# Patient Record
Sex: FEMALE | Race: WHITE
[De-identification: names, ages, dates, MRNs, and addresses within clinical notes are randomized per-mention and may not be internally consistent; named-entity substitution may affect disease eponyms.]

---

## 2018-02-23 ENCOUNTER — HOSPITAL ENCOUNTER (OUTPATIENT)
Dept: HOSPITAL 47 - RADMAMWWP | Age: 63
Discharge: HOME | End: 2018-02-23
Payer: MEDICARE

## 2018-02-23 DIAGNOSIS — Z12.31: Primary | ICD-10-CM

## 2018-02-23 PROCEDURE — 77067 SCR MAMMO BI INCL CAD: CPT

## 2018-02-23 PROCEDURE — 77063 BREAST TOMOSYNTHESIS BI: CPT

## 2018-02-26 NOTE — MM
Reason for exam: screening  (asymptomatic).

Last mammogram was performed 1 year and 6 months ago.



History:

Patient is postmenopausal.

Family history of breast cancer in maternal grandmother at age 80.



Physical Findings:

A clinical breast exam by your physician is recommended on an annual basis and 

results should be correlated with mammographic findings.



MG 3D Screening Mammo W/Cad

Bilateral CC and MLO view(s) were taken.

Prior study comparison: August 23, 2016, bilateral MG 3d screening mammo w/cad.  

April 2, 2012, bilateral digital screening mammo w/CAD.

The breast tissue is heterogeneously dense. This may lower the sensitivity of 

mammography.  There is a stable round circumscribed right lower inner quadrant 

mass from 2016 and decreased in size from 2012. No suspicious abnormality.





ASSESSMENT: Benign, BI-RAD 2



RECOMMENDATION:

Routine screening mammogram of both breasts in 1 year.

## 2018-05-23 ENCOUNTER — HOSPITAL ENCOUNTER (OUTPATIENT)
Dept: HOSPITAL 47 - RADMRIMAIN | Age: 63
Discharge: HOME | End: 2018-05-23
Attending: NURSE PRACTITIONER
Payer: MEDICARE

## 2018-05-23 DIAGNOSIS — M51.34: Primary | ICD-10-CM

## 2018-05-23 DIAGNOSIS — G35: ICD-10-CM

## 2018-05-23 PROCEDURE — 82565 ASSAY OF CREATININE: CPT

## 2018-05-23 PROCEDURE — 72157 MRI CHEST SPINE W/O & W/DYE: CPT

## 2018-05-24 NOTE — MR
Thoracic spine MRI with and without contrast

 

HISTORY: Mid back pain, G 35

 

Multiplanar multisequence and postcontrast images obtained through the thoracic spine following 5.5 c
c Gadavist IV.

 

Correlation to CT chest 12/10/2013

 

Thoracic vertebral bodies show preserved height and alignment. Endplate discogenic marrow signal gomez
ge is present at multiple levels. There is multilevel spondylosis. Loss of disc height signal at inte
rvertebral levels is compatible with disc desiccation and degenerative disc disease. There is no sign
ificant foraminal encroachment or spinal stenosis, mild multilevel disc bulges are minimal. Thoracic 
cord signal is normal. There is no abnormal enhancement following contrast administration.

 

IMPRESSION: Mild degenerative disc disease.

## 2019-06-02 ENCOUNTER — HOSPITAL ENCOUNTER (EMERGENCY)
Dept: HOSPITAL 47 - EC | Age: 64
Discharge: HOME | End: 2019-06-02
Payer: MEDICARE

## 2019-06-02 VITALS
HEART RATE: 95 BPM | DIASTOLIC BLOOD PRESSURE: 83 MMHG | TEMPERATURE: 97.6 F | SYSTOLIC BLOOD PRESSURE: 136 MMHG | RESPIRATION RATE: 18 BRPM

## 2019-06-02 DIAGNOSIS — J44.9: ICD-10-CM

## 2019-06-02 DIAGNOSIS — R42: ICD-10-CM

## 2019-06-02 DIAGNOSIS — Z79.899: ICD-10-CM

## 2019-06-02 DIAGNOSIS — F17.200: ICD-10-CM

## 2019-06-02 DIAGNOSIS — I10: ICD-10-CM

## 2019-06-02 DIAGNOSIS — F41.9: Primary | ICD-10-CM

## 2019-06-02 LAB
ALBUMIN SERPL-MCNC: 4.7 G/DL (ref 3.5–5)
ALP SERPL-CCNC: 99 U/L (ref 38–126)
ALT SERPL-CCNC: 20 U/L (ref 9–52)
ANION GAP SERPL CALC-SCNC: 9 MMOL/L
AST SERPL-CCNC: 23 U/L (ref 14–36)
BASOPHILS # BLD AUTO: 0.1 K/UL (ref 0–0.2)
BASOPHILS NFR BLD AUTO: 1 %
BUN SERPL-SCNC: 13 MG/DL (ref 7–17)
CALCIUM SPEC-MCNC: 10 MG/DL (ref 8.4–10.2)
CHLORIDE SERPL-SCNC: 105 MMOL/L (ref 98–107)
CO2 SERPL-SCNC: 26 MMOL/L (ref 22–30)
EOSINOPHIL # BLD AUTO: 0 K/UL (ref 0–0.7)
EOSINOPHIL NFR BLD AUTO: 1 %
ERYTHROCYTE [DISTWIDTH] IN BLOOD BY AUTOMATED COUNT: 6.19 M/UL (ref 3.8–5.4)
ERYTHROCYTE [DISTWIDTH] IN BLOOD: 13.5 % (ref 11.5–15.5)
GLUCOSE SERPL-MCNC: 90 MG/DL (ref 74–99)
HCT VFR BLD AUTO: 51 % (ref 34–46)
HGB BLD-MCNC: 16.3 GM/DL (ref 11.4–16)
LYMPHOCYTES # SPEC AUTO: 1.4 K/UL (ref 1–4.8)
LYMPHOCYTES NFR SPEC AUTO: 23 %
MCH RBC QN AUTO: 26.4 PG (ref 25–35)
MCHC RBC AUTO-ENTMCNC: 32 G/DL (ref 31–37)
MCV RBC AUTO: 82.4 FL (ref 80–100)
MONOCYTES # BLD AUTO: 0.3 K/UL (ref 0–1)
MONOCYTES NFR BLD AUTO: 5 %
NEUTROPHILS # BLD AUTO: 4.3 K/UL (ref 1.3–7.7)
NEUTROPHILS NFR BLD AUTO: 69 %
PH UR: 6 [PH] (ref 5–8)
PLATELET # BLD AUTO: 253 K/UL (ref 150–450)
POTASSIUM SERPL-SCNC: 4.5 MMOL/L (ref 3.5–5.1)
PROT SERPL-MCNC: 7.9 G/DL (ref 6.3–8.2)
SODIUM SERPL-SCNC: 140 MMOL/L (ref 137–145)
SP GR UR: 1.01 (ref 1–1.03)
UROBILINOGEN UR QL STRIP: <2 MG/DL (ref ?–2)
WBC # BLD AUTO: 6.2 K/UL (ref 3.8–10.6)

## 2019-06-02 PROCEDURE — 36415 COLL VENOUS BLD VENIPUNCTURE: CPT

## 2019-06-02 PROCEDURE — 85025 COMPLETE CBC W/AUTO DIFF WBC: CPT

## 2019-06-02 PROCEDURE — 71046 X-RAY EXAM CHEST 2 VIEWS: CPT

## 2019-06-02 PROCEDURE — 99284 EMERGENCY DEPT VISIT MOD MDM: CPT

## 2019-06-02 PROCEDURE — 96374 THER/PROPH/DIAG INJ IV PUSH: CPT

## 2019-06-02 PROCEDURE — 81003 URINALYSIS AUTO W/O SCOPE: CPT

## 2019-06-02 PROCEDURE — 93005 ELECTROCARDIOGRAM TRACING: CPT

## 2019-06-02 PROCEDURE — 80053 COMPREHEN METABOLIC PANEL: CPT

## 2019-06-02 NOTE — XR
EXAMINATION TYPE: XR chest 2V

 

DATE OF EXAM: 6/2/2019

 

HISTORY: Weakness.

 

REFERENCE: NONE.

 

FINDINGS: The lungs are overinflated but clear. Pleural spaces are clear. The heart is not enlarged.

 

IMPRESSION: 

 

COPD.

## 2019-06-02 NOTE — ED
General Adult HPI





- General


Chief complaint: Dizziness


Stated complaint: facial numbness/lightheaded


Time Seen by Provider: 06/02/19 11:00


Source: patient, RN notes reviewed


Mode of arrival: wheelchair


Limitations: no limitations





- History of Present Illness


Initial comments: 





This is a 63-year-old female who presents emergency department with past medical

history significant for anxiety.  Patient states her boyfriend sleeping on a 

fishing trip tomorrow for 3 days and ever since she found out about it she has 

been very anxious.  Patient states she's been anxious since Tuesday she had 

multiple panic attacks since then today she woke up and felt very dizzy and go 

she has a history of dizziness and is taking Antivert she thought it was worse 

and wanted to come and get checked out at the emergency department.  Patient 

also states she's been having some blurred vision of the left eye and she does 

have a history of MS and has had blurred vision with EMS in the past.  Patient 

states however she did just have eye doctor appointment on Wednesday.  Patient 

denies any pain anywhere.  Patient denies any palpitations patient denies any 

shortness breath difficulty breathing.  Patient has any fever chills.  Patient 

denies any vomiting or diarrhea.  Patient denies any dysuria hematuria or 

frequency.





- Related Data


                                Home Medications











 Medication  Instructions  Recorded  Confirmed


 


Escitalopram [Lexapro] 20 mg PO DAILY 01/29/16 06/02/19


 


ALPRAZolam [Xanax] 0.25 - 0.5 mg PO BID PRN 06/02/19 06/02/19


 


ALPRAZolam [Xanax] 0.5 mg PO DAILY 06/02/19 06/02/19


 


Acetaminophen Tab [Tylenol Tab] 1,000 mg PO SUMO PRN 06/02/19 06/02/19


 


Ibuprofen [Motrin] 800 mg PO Q6H PRN 06/02/19 06/02/19


 


Interferon Beta-1A [Avonex 30 MCG 30 mcg IM ESTEVES 06/02/19 06/02/19





Pen]   


 


Meclizine [Antivert] 25 mg PO DAILY 06/02/19 06/02/19


 


Ondansetron Odt [Zofran Odt] 4 mg PO Q12HR PRN 06/02/19 06/02/19


 


Sennosides [Senna] 8.6 mg PO HS 06/02/19 06/02/19


 


amLODIPine BESYLATE/BENAZEPRIL 1 cap PO HS 06/02/19 06/02/19





[Lotrel 10-40 MG]   











                                    Allergies











Allergy/AdvReac Type Severity Reaction Status Date / Time


 


No Known Allergies Allergy   Verified 06/02/19 11:30














Review of Systems


ROS Statement: 


Those systems with pertinent positive or pertinent negative responses have been 

documented in the HPI.





ROS Other: All systems not noted in ROS Statement are negative.





Past Medical History


Past Medical History: Hypertension, Neurologic Disorder


Additional Past Medical History / Comment(s): HTN, MS


History of Any Multi-Drug Resistant Organisms: None Reported


Past Surgical History: Tonsillectomy


Past Psychological History: Anxiety


Smoking Status: Current every day smoker


Past Alcohol Use History: Rare


Past Drug Use History: None Reported





General Exam





- General Exam Comments


Initial Comments: 





GENERAL:


Patient is well-developed and well-nourished.  Patient is nontoxic and well-

hydrated and is in mild distress.





ENT:


Neck is soft and supple.  No significant lymphadenopathy is noted.  Oropharynx 

is clear.  Moist mucous membranes.  Neck has full range of motion without 

eliciting any pain. 





EYES:


The sclera were anicteric and conjunctiva were pink and moist.  Extraocular 

movements were intact and pupils were equal round and reactive to light.  Eyelid

s were unremarkable.





PULMONARY:


Unlabored respirations.  Good breath sounds bilaterally.  No audible rales 

rhonchi or wheezing was noted.





CARDIOVASCULAR:


There is a regular rate and rhythm without any murmurs gallops or rubs. 





ABDOMEN:


Soft and nontender with normal bowel sounds.  No palpable organomegaly was 

noted.  There is no palpable pulsatile mass.





SKIN:


Skin is clear with no lesions or rashes and otherwise unremarkable.





NEUROLOGIC:


Patient is alert and oriented x3.  Cranial nerves II through XII are grossly 

intact.  Motor and sensory are also intact.  Normal speech, volume and content. 

Symmetrical smile.   





MUSCULOSKELETAL:


Normal extremities with adequate strength and full range of motion.  No lower 

extremity swelling or edema.  No calf tenderness.





LYMPHATICS:


No significant lymphadenopathy is noted





PSYCHIATRIC:


Patient is moderately anxious


Limitations: no limitations





Course


                                   Vital Signs











  06/02/19





  11:00


 


Temperature 97.6 F


 


Pulse Rate 95


 


Respiratory 18





Rate 


 


Blood Pressure 136/83


 


O2 Sat by Pulse 98





Oximetry 














Medical Decision Making





- Medical Decision Making





EKG shows normal sinus rhythm at 91 bpm VT interval 244 Ray is 66 QT interval

362 QTC is 445.  Patient's EKG shows no ST segment elevation or depression or T 

wave abnormalities are noted.





Chest x-ray shows no acute abnormality





- Lab Data


Result diagrams: 


                                 06/02/19 11:33





                                 06/02/19 11:33


                                   Lab Results











  06/02/19 06/02/19 06/02/19 Range/Units





  11:33 11:33 12:06 


 


WBC  6.2    (3.8-10.6)  k/uL


 


RBC  6.19 H    (3.80-5.40)  m/uL


 


Hgb  16.3 H    (11.4-16.0)  gm/dL


 


Hct  51.0 H    (34.0-46.0)  %


 


MCV  82.4    (80.0-100.0)  fL


 


MCH  26.4    (25.0-35.0)  pg


 


MCHC  32.0    (31.0-37.0)  g/dL


 


RDW  13.5    (11.5-15.5)  %


 


Plt Count  253    (150-450)  k/uL


 


Neutrophils %  69    %


 


Lymphocytes %  23    %


 


Monocytes %  5    %


 


Eosinophils %  1    %


 


Basophils %  1    %


 


Neutrophils #  4.3    (1.3-7.7)  k/uL


 


Lymphocytes #  1.4    (1.0-4.8)  k/uL


 


Monocytes #  0.3    (0-1.0)  k/uL


 


Eosinophils #  0.0    (0-0.7)  k/uL


 


Basophils #  0.1    (0-0.2)  k/uL


 


Sodium   140   (137-145)  mmol/L


 


Potassium   4.5   (3.5-5.1)  mmol/L


 


Chloride   105   ()  mmol/L


 


Carbon Dioxide   26   (22-30)  mmol/L


 


Anion Gap   9   mmol/L


 


BUN   13   (7-17)  mg/dL


 


Creatinine   0.65   (0.52-1.04)  mg/dL


 


Est GFR (CKD-EPI)AfAm   >90   (>60 ml/min/1.73 sqM)  


 


Est GFR (CKD-EPI)NonAf   >90   (>60 ml/min/1.73 sqM)  


 


Glucose   90   (74-99)  mg/dL


 


Calcium   10.0   (8.4-10.2)  mg/dL


 


Total Bilirubin   0.5   (0.2-1.3)  mg/dL


 


AST   23   (14-36)  U/L


 


ALT   20   (9-52)  U/L


 


Alkaline Phosphatase   99   ()  U/L


 


Total Protein   7.9   (6.3-8.2)  g/dL


 


Albumin   4.7   (3.5-5.0)  g/dL


 


Urine Color    Yellow  


 


Urine Appearance    Clear  (Clear)  


 


Urine pH    6.0  (5.0-8.0)  


 


Ur Specific Gravity    1.012  (1.001-1.035)  


 


Urine Protein    Negative  (Negative)  


 


Urine Glucose (UA)    Negative  (Negative)  


 


Urine Ketones    Negative  (Negative)  


 


Urine Blood    Negative  (Negative)  


 


Urine Nitrite    Negative  (Negative)  


 


Urine Bilirubin    Negative  (Negative)  


 


Urine Urobilinogen    <2.0  (<2.0)  mg/dL


 


Ur Leukocyte Esterase    Negative  (Negative)  














Disposition


Clinical Impression: 


 Anxiety, Lightheaded





Disposition: HOME SELF-CARE


Condition: Good


Instructions (If sedation given, give patient instructions):  Lightheadedness (

ED), Anxiety (ED)


Is patient prescribed a controlled substance at d/c from ED?: No


Referrals: 


Jay Jay Monteiro MD [Primary Care Provider] - 1-2 days


Time of Disposition: 12:43

## 2020-11-30 ENCOUNTER — HOSPITAL ENCOUNTER (INPATIENT)
Dept: HOSPITAL 47 - EC | Age: 65
LOS: 5 days | Discharge: HOME HEALTH | DRG: 190 | End: 2020-12-05
Attending: HOSPITALIST | Admitting: HOSPITALIST
Payer: MEDICARE

## 2020-11-30 DIAGNOSIS — N17.0: ICD-10-CM

## 2020-11-30 DIAGNOSIS — J96.02: ICD-10-CM

## 2020-11-30 DIAGNOSIS — J44.1: Primary | ICD-10-CM

## 2020-11-30 DIAGNOSIS — F41.8: ICD-10-CM

## 2020-11-30 DIAGNOSIS — G93.41: ICD-10-CM

## 2020-11-30 DIAGNOSIS — F17.210: ICD-10-CM

## 2020-11-30 DIAGNOSIS — Z79.899: ICD-10-CM

## 2020-11-30 DIAGNOSIS — Z79.51: ICD-10-CM

## 2020-11-30 DIAGNOSIS — I11.9: ICD-10-CM

## 2020-11-30 DIAGNOSIS — R32: ICD-10-CM

## 2020-11-30 DIAGNOSIS — Z20.828: ICD-10-CM

## 2020-11-30 DIAGNOSIS — Z90.89: ICD-10-CM

## 2020-11-30 DIAGNOSIS — G25.81: ICD-10-CM

## 2020-11-30 DIAGNOSIS — Z71.6: ICD-10-CM

## 2020-11-30 DIAGNOSIS — G35: ICD-10-CM

## 2020-11-30 DIAGNOSIS — Z98.890: ICD-10-CM

## 2020-11-30 DIAGNOSIS — J96.01: ICD-10-CM

## 2020-11-30 DIAGNOSIS — F41.1: ICD-10-CM

## 2020-11-30 DIAGNOSIS — T58.91XA: ICD-10-CM

## 2020-11-30 LAB
ALBUMIN SERPL-MCNC: 4.9 G/DL (ref 3.5–5)
ALP SERPL-CCNC: 99 U/L (ref 38–126)
ALT SERPL-CCNC: 21 U/L (ref 4–34)
ANION GAP SERPL CALC-SCNC: 15 MMOL/L
APTT BLD: 18.3 SEC (ref 22–30)
AST SERPL-CCNC: 36 U/L (ref 14–36)
BASOPHILS # BLD AUTO: 0.1 K/UL (ref 0–0.2)
BASOPHILS NFR BLD AUTO: 1 %
BUN SERPL-SCNC: 18 MG/DL (ref 7–17)
CALCIUM SPEC-MCNC: 10 MG/DL (ref 8.4–10.2)
CHLORIDE SERPL-SCNC: 94 MMOL/L (ref 98–107)
CO2 SERPL-SCNC: 25 MMOL/L (ref 22–30)
EOSINOPHIL # BLD AUTO: 0.2 K/UL (ref 0–0.7)
EOSINOPHIL NFR BLD AUTO: 1 %
ERYTHROCYTE [DISTWIDTH] IN BLOOD BY AUTOMATED COUNT: 5.8 M/UL (ref 3.8–5.4)
ERYTHROCYTE [DISTWIDTH] IN BLOOD: 14.2 % (ref 11.5–15.5)
GLUCOSE SERPL-MCNC: 166 MG/DL (ref 74–99)
HCT VFR BLD AUTO: 49.8 % (ref 34–46)
HGB BLD-MCNC: 15.5 GM/DL (ref 11.4–16)
INR PPP: 0.9 (ref ?–1.2)
LYMPHOCYTES # SPEC AUTO: 1 K/UL (ref 1–4.8)
LYMPHOCYTES NFR SPEC AUTO: 8 %
MAGNESIUM SPEC-SCNC: 2 MG/DL (ref 1.6–2.3)
MCH RBC QN AUTO: 26.7 PG (ref 25–35)
MCHC RBC AUTO-ENTMCNC: 31 G/DL (ref 31–37)
MCV RBC AUTO: 85.9 FL (ref 80–100)
MONOCYTES # BLD AUTO: 0.3 K/UL (ref 0–1)
MONOCYTES NFR BLD AUTO: 2 %
NEUTROPHILS # BLD AUTO: 10.2 K/UL (ref 1.3–7.7)
NEUTROPHILS NFR BLD AUTO: 87 %
PH UR: 5 [PH] (ref 5–8)
PLATELET # BLD AUTO: 249 K/UL (ref 150–450)
POTASSIUM SERPL-SCNC: 4.6 MMOL/L (ref 3.5–5.1)
PROT SERPL-MCNC: 8.9 G/DL (ref 6.3–8.2)
PT BLD: 9.4 SEC (ref 9–12)
SODIUM SERPL-SCNC: 134 MMOL/L (ref 137–145)
SP GR UR: 1.01 (ref 1–1.03)
UROBILINOGEN UR QL STRIP: <2 MG/DL (ref ?–2)
WBC # BLD AUTO: 11.8 K/UL (ref 3.8–10.6)

## 2020-11-30 PROCEDURE — 94660 CPAP INITIATION&MGMT: CPT

## 2020-11-30 PROCEDURE — 71045 X-RAY EXAM CHEST 1 VIEW: CPT

## 2020-11-30 PROCEDURE — 71275 CT ANGIOGRAPHY CHEST: CPT

## 2020-11-30 PROCEDURE — 83605 ASSAY OF LACTIC ACID: CPT

## 2020-11-30 PROCEDURE — 36415 COLL VENOUS BLD VENIPUNCTURE: CPT

## 2020-11-30 PROCEDURE — 83735 ASSAY OF MAGNESIUM: CPT

## 2020-11-30 PROCEDURE — 96376 TX/PRO/DX INJ SAME DRUG ADON: CPT

## 2020-11-30 PROCEDURE — 84484 ASSAY OF TROPONIN QUANT: CPT

## 2020-11-30 PROCEDURE — 80053 COMPREHEN METABOLIC PANEL: CPT

## 2020-11-30 PROCEDURE — 85730 THROMBOPLASTIN TIME PARTIAL: CPT

## 2020-11-30 PROCEDURE — 85025 COMPLETE CBC W/AUTO DIFF WBC: CPT

## 2020-11-30 PROCEDURE — 87635 SARS-COV-2 COVID-19 AMP PRB: CPT

## 2020-11-30 PROCEDURE — 94640 AIRWAY INHALATION TREATMENT: CPT

## 2020-11-30 PROCEDURE — 85610 PROTHROMBIN TIME: CPT

## 2020-11-30 PROCEDURE — 93005 ELECTROCARDIOGRAM TRACING: CPT

## 2020-11-30 PROCEDURE — 81003 URINALYSIS AUTO W/O SCOPE: CPT

## 2020-11-30 PROCEDURE — 51701 INSERT BLADDER CATHETER: CPT

## 2020-11-30 PROCEDURE — 94760 N-INVAS EAR/PLS OXIMETRY 1: CPT

## 2020-11-30 PROCEDURE — 99291 CRITICAL CARE FIRST HOUR: CPT

## 2020-11-30 PROCEDURE — 76770 US EXAM ABDO BACK WALL COMP: CPT

## 2020-11-30 PROCEDURE — 96374 THER/PROPH/DIAG INJ IV PUSH: CPT

## 2020-11-30 PROCEDURE — 80048 BASIC METABOLIC PNL TOTAL CA: CPT

## 2020-11-30 PROCEDURE — 85027 COMPLETE CBC AUTOMATED: CPT

## 2020-11-30 PROCEDURE — 5A09557 ASSISTANCE WITH RESPIRATORY VENTILATION, GREATER THAN 96 CONSECUTIVE HOURS, CONTINUOUS POSITIVE AIRWAY PRESSURE: ICD-10-PCS

## 2020-11-30 RX ADMIN — IPRATROPIUM BROMIDE SCH MG: 0.5 SOLUTION RESPIRATORY (INHALATION) at 18:49

## 2020-11-30 RX ADMIN — NICOTINE SCH PATCH: 21 PATCH, EXTENDED RELEASE TRANSDERMAL at 19:01

## 2020-11-30 NOTE — ED
General Adult HPI





- General


Chief complaint: Shortness of Breath


Stated complaint: ANALI


Time Seen by Provider: 11/30/20 15:45


Source: patient, EMS, RN notes reviewed, old records reviewed


Mode of arrival: EMS


Limitations: no limitations





- History of Present Illness


Initial comments: 





This a 65-year-old female presents emergency Department with a past history of 

COPD.  According to EMS the patient was found on the ground quite cyanotic and 

he immediately put her on BiPAP and she slowly came around and had bilateral 

diffuse wheezing patient was given 3 albuterol treatments and 2 Atrovent on the 

way in.  Patient's also given Solu-Medrol.  On arrival patient was feeling 

considerably better.  Patient denies any chest pain or palpitations.  Patient 

denies any recent fever chills or cough per patient denies any abdominal pain.  

Patient denies any headache.  Patient states she does not remember what was 

going on prior to her being found on the ground.  According to EMS it sounded 

like the patient must at some sort of syncopal episode at some point





- Related Data


                                Home Medications











 Medication  Instructions  Recorded  Confirmed


 


Escitalopram [Lexapro] 20 mg PO DAILY 01/29/16 11/30/20


 


ALPRAZolam [Xanax] 0.5 mg PO HS PRN 06/02/19 11/30/20


 


ALPRAZolam [Xanax] 0.5 mg PO TID 06/02/19 11/30/20


 


Acetaminophen Tab [Tylenol Tab] 1,000 mg PO DAILY PRN 06/02/19 11/30/20


 


Ibuprofen [Motrin] 800 mg PO Q6H PRN 06/02/19 11/30/20


 


Meclizine [Antivert] 25 mg PO TID PRN 06/02/19 11/30/20


 


Ondansetron Odt [Zofran Odt] 4 mg PO Q12HR PRN 06/02/19 11/30/20


 


amLODIPine BESYLATE/BENAZEPRIL 1 cap PO HS 06/02/19 11/30/20





[Lotrel 10-40 MG]   


 


Budesonide/Formoterol Fumarate 1 puff INHALATION RT-BID 11/30/20 11/30/20





[Symbicort 160-4.5 Mcg Inhaler]   


 


Furosemide [Lasix] 20 mg PO DAILY 11/30/20 11/30/20


 


Metoprolol Tartrate [Lopressor] 25 mg PO DAILY 11/30/20 11/30/20


 


OLANZapine [ZyPREXA] 10 mg PO DAILY 11/30/20 11/30/20


 


Omeprazole 20 mg PO DAILY 11/30/20 11/30/20


 


Teriflunomide [Aubagio] 7 mg PO DAILY 11/30/20 11/30/20











                                    Allergies











Allergy/AdvReac Type Severity Reaction Status Date / Time


 


No Known Allergies Allergy   Verified 06/02/19 11:30














Review of Systems


ROS Statement: 


Those systems with pertinent positive or pertinent negative responses have been 

documented in the HPI.





ROS Other: All systems not noted in ROS Statement are negative.





Past Medical History


Past Medical History: Hypertension, Neurologic Disorder


Additional Past Medical History / Comment(s): HTN, MS


History of Any Multi-Drug Resistant Organisms: None Reported


Past Surgical History: Tonsillectomy


Past Psychological History: Anxiety


Smoking Status: Current every day smoker


Past Alcohol Use History: Rare


Past Drug Use History: None Reported





General Exam





- General Exam Comments


Initial Comments: 





GENERAL:


Patient is well-developed and well-nourished.  Patient is nontoxic and well-

hydrated and is in mild distress.





ENT:


Neck is soft and supple.  No significant lymphadenopathy is noted.  Oropharynx 

is clear.  Moist mucous membranes.  Neck has full range of motion without 

eliciting any pain.  





EYES:


The sclera were anicteric and conjunctiva were pink and moist.  Extraocular 

movements were intact and pupils were equal round and reactive to light.  

Eyelids were unremarkable.





PULMONARY:


Diffuse expiratory wheezing





CARDIOVASCULAR:


There is a regular rate and rhythm without any murmurs gallops or rubs.  





ABDOMEN:


Soft and nontender with normal bowel sounds.  





SKIN:


Skin is clear with no lesions or rashes and otherwise unremarkable.





NEUROLOGIC:


Patient is alert and oriented x3.  Cranial nerves II through XII are grossly 

intact.  Motor and sensory are also intact.  Normal speech, volume and content. 

 Symmetrical smile.  





MUSCULOSKELETAL:


Normal extremities with adequate strength and full range of motion.  





LYMPHATICS:


No significant lymphadenopathy is noted





PSYCHIATRIC:


Normal psychiatric evaluation.  


Limitations: no limitations





Course


                                   Vital Signs











  11/30/20 11/30/20 11/30/20





  15:47 15:52 16:01


 


Temperature 96.8 F L  


 


Pulse Rate 102 H 92 104 H


 


Respiratory 18 20 35 H





Rate   


 


Blood Pressure 111/70  


 


O2 Sat by Pulse 98 98 





Oximetry   














  11/30/20 11/30/20 11/30/20





  16:15 16:25 16:52


 


Temperature   


 


Pulse Rate  91 95


 


Respiratory 22 33 H 20





Rate   


 


Blood Pressure   98/74


 


O2 Sat by Pulse   98





Oximetry   














Medical Decision Making





- Medical Decision Making





EKG shows a normal sinus rhythm at 96 bpm KS interval is on a 46 QRS is 68 QT 

interval 344 QTC is 447.  Patient's EKG shows no ST segment elevation or 

depression.





Chest x-ray shows no acute abnormality.





Patient received 3 treatments of albuterol and Atrovent in the emergency 

department.  Patient improved but she remained on BiPAP.





I spoke with Dr. Flower agreed to admit the patient admitted the patient wrote 

admitting orders.  I consult pulmonary.





Patient has an elevated lactic acid and I believe it secondary to hypoxia 

because when EMS found her she was at 60% pulse ox.





- Lab Data


Result diagrams: 


                                 11/30/20 16:10





                                 11/30/20 16:10


                                   Lab Results











  11/30/20 11/30/20 11/30/20 Range/Units





  16:10 16:10 16:10 


 


WBC  11.8 H    (3.8-10.6)  k/uL


 


RBC  5.80 H    (3.80-5.40)  m/uL


 


Hgb  15.5    (11.4-16.0)  gm/dL


 


Hct  49.8 H    (34.0-46.0)  %


 


MCV  85.9    (80.0-100.0)  fL


 


MCH  26.7    (25.0-35.0)  pg


 


MCHC  31.0    (31.0-37.0)  g/dL


 


RDW  14.2    (11.5-15.5)  %


 


Plt Count  249    (150-450)  k/uL


 


MPV  9.3    


 


Neutrophils %  87    %


 


Lymphocytes %  8    %


 


Monocytes %  2    %


 


Eosinophils %  1    %


 


Basophils %  1    %


 


Neutrophils #  10.2 H    (1.3-7.7)  k/uL


 


Lymphocytes #  1.0    (1.0-4.8)  k/uL


 


Monocytes #  0.3    (0-1.0)  k/uL


 


Eosinophils #  0.2    (0-0.7)  k/uL


 


Basophils #  0.1    (0-0.2)  k/uL


 


PT   9.4   (9.0-12.0)  sec


 


INR   0.9   (<1.2)  


 


APTT   18.3 L   (22.0-30.0)  sec


 


Sodium    134 L  (137-145)  mmol/L


 


Potassium    4.6  (3.5-5.1)  mmol/L


 


Chloride    94 L  ()  mmol/L


 


Carbon Dioxide    25  (22-30)  mmol/L


 


Anion Gap    15  mmol/L


 


BUN    18 H  (7-17)  mg/dL


 


Creatinine    1.67 H  (0.52-1.04)  mg/dL


 


Est GFR (CKD-EPI)AfAm    37  (>60 ml/min/1.73 sqM)  


 


Est GFR (CKD-EPI)NonAf    32  (>60 ml/min/1.73 sqM)  


 


Glucose    166 H  (74-99)  mg/dL


 


Plasma Lactic Acid Russell     (0.7-2.0)  mmol/L


 


Calcium    10.0  (8.4-10.2)  mg/dL


 


Magnesium    2.0  (1.6-2.3)  mg/dL


 


Total Bilirubin    0.4  (0.2-1.3)  mg/dL


 


AST    36  (14-36)  U/L


 


ALT    21  (4-34)  U/L


 


Alkaline Phosphatase    99  ()  U/L


 


Troponin I     (0.000-0.034)  ng/mL


 


Total Protein    8.9 H  (6.3-8.2)  g/dL


 


Albumin    4.9  (3.5-5.0)  g/dL


 


Coronavirus (PCR)     (Not Detectd)  














  11/30/20 11/30/20 11/30/20 Range/Units





  16:10 16:10 17:10 


 


WBC     (3.8-10.6)  k/uL


 


RBC     (3.80-5.40)  m/uL


 


Hgb     (11.4-16.0)  gm/dL


 


Hct     (34.0-46.0)  %


 


MCV     (80.0-100.0)  fL


 


MCH     (25.0-35.0)  pg


 


MCHC     (31.0-37.0)  g/dL


 


RDW     (11.5-15.5)  %


 


Plt Count     (150-450)  k/uL


 


MPV     


 


Neutrophils %     %


 


Lymphocytes %     %


 


Monocytes %     %


 


Eosinophils %     %


 


Basophils %     %


 


Neutrophils #     (1.3-7.7)  k/uL


 


Lymphocytes #     (1.0-4.8)  k/uL


 


Monocytes #     (0-1.0)  k/uL


 


Eosinophils #     (0-0.7)  k/uL


 


Basophils #     (0-0.2)  k/uL


 


PT     (9.0-12.0)  sec


 


INR     (<1.2)  


 


APTT     (22.0-30.0)  sec


 


Sodium     (137-145)  mmol/L


 


Potassium     (3.5-5.1)  mmol/L


 


Chloride     ()  mmol/L


 


Carbon Dioxide     (22-30)  mmol/L


 


Anion Gap     mmol/L


 


BUN     (7-17)  mg/dL


 


Creatinine     (0.52-1.04)  mg/dL


 


Est GFR (CKD-EPI)AfAm     (>60 ml/min/1.73 sqM)  


 


Est GFR (CKD-EPI)NonAf     (>60 ml/min/1.73 sqM)  


 


Glucose     (74-99)  mg/dL


 


Plasma Lactic Acid Russell  4.7 H*    (0.7-2.0)  mmol/L


 


Calcium     (8.4-10.2)  mg/dL


 


Magnesium     (1.6-2.3)  mg/dL


 


Total Bilirubin     (0.2-1.3)  mg/dL


 


AST     (14-36)  U/L


 


ALT     (4-34)  U/L


 


Alkaline Phosphatase     ()  U/L


 


Troponin I   <0.012   (0.000-0.034)  ng/mL


 


Total Protein     (6.3-8.2)  g/dL


 


Albumin     (3.5-5.0)  g/dL


 


Coronavirus (PCR)    Not Detected  (Not Detectd)  














Critical Care Time


Critical Care Time: Yes


Total Critical Care Time: 35





Disposition


Clinical Impression: 


 Acute exacerbation of chronic obstructive pulmonary disease





Disposition: ADMITTED AS IP TO THIS HOSP


Referrals: 


Jay Jay Monteiro MD [Primary Care Provider] - 1-2 days


Time of Disposition: 18:02

## 2020-11-30 NOTE — XR
EXAMINATION TYPE: XR chest 1V portable

 

DATE OF EXAM: 11/30/2020

 

COMPARISON: Prior chest x-ray 6/2/2019

 

HISTORY: Difficulty breathing

 

TECHNIQUE: Single frontal view of the chest is obtained.

 

FINDINGS:  There is no focal air space opacity, pleural effusion, or pneumothorax seen.  The cardiac 
silhouette size is within normal limits. Patient is rotated. There are overlying cardiac leads.  The 
osseous structures are intact.

 

IMPRESSION:  No acute process.

## 2020-12-01 RX ADMIN — FORMOTEROL FUMARATE DIHYDRATE SCH: 20 SOLUTION RESPIRATORY (INHALATION) at 13:49

## 2020-12-01 RX ADMIN — IPRATROPIUM BROMIDE SCH MG: 0.5 SOLUTION RESPIRATORY (INHALATION) at 08:10

## 2020-12-01 RX ADMIN — PRAMIPEXOLE DIHYDROCHLORIDE SCH MG: 0.5 TABLET ORAL at 21:24

## 2020-12-01 RX ADMIN — NICOTINE SCH PATCH: 21 PATCH, EXTENDED RELEASE TRANSDERMAL at 10:53

## 2020-12-01 RX ADMIN — IPRATROPIUM BROMIDE SCH: 0.5 SOLUTION RESPIRATORY (INHALATION) at 11:56

## 2020-12-01 RX ADMIN — BUDESONIDE SCH MG: 1 SUSPENSION RESPIRATORY (INHALATION) at 20:40

## 2020-12-01 RX ADMIN — IPRATROPIUM BROMIDE AND ALBUTEROL SULFATE SCH ML: .5; 3 SOLUTION RESPIRATORY (INHALATION) at 15:49

## 2020-12-01 RX ADMIN — ESCITALOPRAM OXALATE SCH MG: 20 TABLET, FILM COATED ORAL at 10:46

## 2020-12-01 RX ADMIN — PRAMIPEXOLE DIHYDROCHLORIDE SCH MG: 0.5 TABLET ORAL at 16:15

## 2020-12-01 RX ADMIN — FORMOTEROL FUMARATE DIHYDRATE SCH MCG: 20 SOLUTION RESPIRATORY (INHALATION) at 20:40

## 2020-12-01 RX ADMIN — ENOXAPARIN SODIUM SCH MG: 40 INJECTION SUBCUTANEOUS at 13:58

## 2020-12-01 RX ADMIN — PANTOPRAZOLE SODIUM SCH MG: 40 TABLET, DELAYED RELEASE ORAL at 05:23

## 2020-12-01 RX ADMIN — METHYLPREDNISOLONE SODIUM SUCCINATE SCH MG: 125 INJECTION, POWDER, FOR SOLUTION INTRAMUSCULAR; INTRAVENOUS at 00:14

## 2020-12-01 RX ADMIN — IPRATROPIUM BROMIDE AND ALBUTEROL SULFATE PRN ML: .5; 3 SOLUTION RESPIRATORY (INHALATION) at 05:12

## 2020-12-01 RX ADMIN — IPRATROPIUM BROMIDE AND ALBUTEROL SULFATE SCH ML: .5; 3 SOLUTION RESPIRATORY (INHALATION) at 20:40

## 2020-12-01 RX ADMIN — METHYLPREDNISOLONE SODIUM SUCCINATE SCH MG: 125 INJECTION, POWDER, FOR SOLUTION INTRAMUSCULAR; INTRAVENOUS at 10:53

## 2020-12-01 RX ADMIN — IPRATROPIUM BROMIDE AND ALBUTEROL SULFATE PRN ML: .5; 3 SOLUTION RESPIRATORY (INHALATION) at 11:55

## 2020-12-01 RX ADMIN — METHYLPREDNISOLONE SODIUM SUCCINATE SCH MG: 125 INJECTION, POWDER, FOR SOLUTION INTRAMUSCULAR; INTRAVENOUS at 05:22

## 2020-12-01 RX ADMIN — ASPIRIN SCH: 325 TABLET ORAL at 13:51

## 2020-12-01 RX ADMIN — BUDESONIDE SCH: 1 SUSPENSION RESPIRATORY (INHALATION) at 13:48

## 2020-12-01 RX ADMIN — METOPROLOL TARTRATE SCH MG: 25 TABLET, FILM COATED ORAL at 10:52

## 2020-12-01 NOTE — P.HPIM
History of Present Illness


H&P Date: 12/01/20


Chief Complaint: Short of breath





History of presenting complaint:


This is a pleasant 65-year-old patient of visiting physicians Dr. Monteiro.  

Chronic stable medical conditions include hypertension, multiple sclerosis, 

anxiety depression, hypertension, urinary incontinence.  Long-standing smoker.  

Daughter is at the bedside.  Last 2-3 days patient had getting increasingly sh

ort of breath.  Increasing wheezing.  And out of the house to smoke today came 

back, and the last she remembers sitting on the sofa.  She was found on the 

carpeted floor.  She is very cyanotic and hypoxic and as per the EMS pulse ox 

was 60%.  Patient put on a BiPAP in the ER and patient recovered.  And after 

bronchodilators feeling better.  Still wheezing.  Has not followed with any 

doctor.  No seizure activity was reported.  No chest pain.





Review of systems:


GEN.:  Tired


EYES: None


HEENT: None


NECK: None


RESPIRATORY: As above


CARDIOVASCULAR: None


GASTROINTESTINAL: None


GENITOURINARY: Urinary incontinence


MUSCULOSKELETAL: Joint pains


LYMPHATICS: None


HEMATOLOGICAL: None  


PSYCHIATRY: Anxious


NEUROLOGICAL: None





Past medical history to include:


Multiple sclerosis, anxiety, depression, COPD, hypertension, urinary 

incontinence





Social history:


Smokes a pack and a half a day for about 49 years.  No alcohol.  Lives with her 

boyfriend





Physical examination:


VITAL SIGNS: 96.8, 1 or 2, 35, 111/70, 98% on BiPAP upon presentation


GENERAL: BMI 27.1, propped up in bed, short of breath.


EYES: Pupils equal.  Conjunctiva normal.


HEENT: External appearance of nose and ears normal, oral cavity grossly normal.


NECK: JVD not raised; masses not palpable.


HEART: First and second heart sounds are normal;  no edema.  


LUNGS: Respiratory rate increased, diminished breath sound prominent expiration 

and wheezing.  


ABDOMEN: Soft,  nontender, liver spleen not palpable, no masses palpable.  


PSYCH: [Alert and oriented x3;  mood  and affect anxious l.  


NEUROLOGICAL: Cranial nerves grossly intact; no facial asymmetry,   power and 

sensation grossly intact. 


LYMPHATICS: No lymph nodes palpable in the axilla and neck





INVESTIGATIONS, reviewed in the clinical context:


White count 11.8 hemoglobin 15.5 platelets 249 potassium 4.6 bun 18 creatinine 

1.67


Lactic acid 4.7


Troponin I less than 0.012


Coronavirus P/Cr-not detected


EKG tracing personally reviewed by me-normal sinus rhythm, P pulmonale


Chest x-ray film personally reviewed by me-no obvious infiltrate.  Prominent 

pulmonary artery





Assessment:


-Acute severe COPD exacerbation in a current smoker


-Acute hypoxic, suspect hypercapnic respiratory failure, POA


-Acute metabolic encephalopathy from CO2 narcosis likely on presentation


-Chronic nicotine dependence patient active cigarette smoker


-Essential hypertension


-Chronic urinary incontinence


-Anxiety depression not otherwise specified


-Abnormal kidney function, rule out the chronic component





Plan:


Patient be started on nebulized bronchodilators, inhaled and IV steroids.  Home 

medications resumed.  DVT prophylaxis.  Oxygen 2 toscale down.  Pulmonary been 

consulted.  Care was discussed at length with the patient and the daughter the 

bedside.





Smoke cessation counseling:


This was done with the patient.  Nicotine patch is being given.  More than 3 

minutes was spent for this





Past Medical History


Past Medical History: Hypertension, Neurologic Disorder


Additional Past Medical History / Comment(s): HTN, MS


History of Any Multi-Drug Resistant Organisms: None Reported


Past Surgical History: Tonsillectomy


Past Psychological History: Anxiety


Smoking Status: Current every day smoker


Past Alcohol Use History: Rare


Past Drug Use History: None Reported





Medications and Allergies


                                Home Medications











 Medication  Instructions  Recorded  Confirmed  Type


 


Escitalopram [Lexapro] 20 mg PO DAILY 01/29/16 11/30/20 History


 


ALPRAZolam [Xanax] 0.5 mg PO HS PRN 06/02/19 11/30/20 History


 


ALPRAZolam [Xanax] 0.5 mg PO TID 06/02/19 11/30/20 History


 


Acetaminophen Tab [Tylenol Tab] 1,000 mg PO DAILY PRN 06/02/19 11/30/20 History


 


Ibuprofen [Motrin] 800 mg PO Q6H PRN 06/02/19 11/30/20 History


 


Meclizine [Antivert] 25 mg PO TID PRN 06/02/19 11/30/20 History


 


Ondansetron Odt [Zofran Odt] 4 mg PO Q12HR PRN 06/02/19 11/30/20 History


 


amLODIPine BESYLATE/BENAZEPRIL 1 cap PO HS 06/02/19 11/30/20 History





[Lotrel 10-40 MG]    


 


Budesonide/Formoterol Fumarate 1 puff INHALATION RT-BID 11/30/20 11/30/20 

History





[Symbicort 160-4.5 Mcg Inhaler]    


 


Furosemide [Lasix] 20 mg PO DAILY 11/30/20 11/30/20 History


 


Metoprolol Tartrate [Lopressor] 25 mg PO DAILY 11/30/20 11/30/20 History


 


OLANZapine [ZyPREXA] 10 mg PO DAILY 11/30/20 11/30/20 History


 


Omeprazole 20 mg PO DAILY 11/30/20 11/30/20 History


 


Teriflunomide [Aubagio] 7 mg PO DAILY 11/30/20 11/30/20 History








                                    Allergies











Allergy/AdvReac Type Severity Reaction Status Date / Time


 


No Known Allergies Allergy   Verified 06/02/19 11:30














Physical Exam


Vitals: 


                                   Vital Signs











  Temp Pulse Pulse Resp BP BP Pulse Ox


 


 12/01/20 08:28   96     


 


 12/01/20 08:11   91     


 


 12/01/20 07:01     18    98


 


 12/01/20 06:30     18    98


 


 12/01/20 05:21   104 H     


 


 12/01/20 05:14   96     


 


 12/01/20 04:00  98.1 F   96  18    95


 


 12/01/20 02:00     20   


 


 12/01/20 01:32  98.0 F   95  20   123/79  93 L


 


 11/30/20 20:00   98   18  93/59   93 L


 


 11/30/20 19:01   94   31 H   


 


 11/30/20 19:00   94   22    98


 


 11/30/20 18:49   97   32 H   


 


 11/30/20 18:00   96   20  100/71   98


 


 11/30/20 17:00   93   20  106/59   98


 


 11/30/20 16:52   95   20  98/74   98


 


 11/30/20 16:25   91   33 H   


 


 11/30/20 16:15     22   


 


 11/30/20 16:01   104 H   35 H   


 


 11/30/20 15:52   92   20    98


 


 11/30/20 15:47  96.8 F L  102 H   18  111/70   98








                                Intake and Output











 11/30/20 12/01/20 12/01/20





 22:59 06:59 14:59


 


Intake Total  250 


 


Balance  250 


 


Intake:   


 


  Oral  250 


 


Other:   


 


  Voiding Method  Toilet 


 


  # Voids  0 


 


  Weight 67.132 kg 67.132 kg 














Results


CBC & Chem 7: 


                                 11/30/20 16:10





                                 11/30/20 16:10


Labs: 


                  Abnormal Lab Results - Last 24 Hours (Table)











  11/30/20 11/30/20 11/30/20 Range/Units





  16:10 16:10 16:10 


 


WBC  11.8 H    (3.8-10.6)  k/uL


 


RBC  5.80 H    (3.80-5.40)  m/uL


 


Hct  49.8 H    (34.0-46.0)  %


 


Neutrophils #  10.2 H    (1.3-7.7)  k/uL


 


APTT   18.3 L   (22.0-30.0)  sec


 


Sodium    134 L  (137-145)  mmol/L


 


Chloride    94 L  ()  mmol/L


 


BUN    18 H  (7-17)  mg/dL


 


Creatinine    1.67 H  (0.52-1.04)  mg/dL


 


Glucose    166 H  (74-99)  mg/dL


 


Plasma Lactic Acid Russell     (0.7-2.0)  mmol/L


 


Total Protein    8.9 H  (6.3-8.2)  g/dL














  11/30/20 11/30/20 Range/Units





  16:10 18:58 


 


WBC    (3.8-10.6)  k/uL


 


RBC    (3.80-5.40)  m/uL


 


Hct    (34.0-46.0)  %


 


Neutrophils #    (1.3-7.7)  k/uL


 


APTT    (22.0-30.0)  sec


 


Sodium    (137-145)  mmol/L


 


Chloride    ()  mmol/L


 


BUN    (7-17)  mg/dL


 


Creatinine    (0.52-1.04)  mg/dL


 


Glucose    (74-99)  mg/dL


 


Plasma Lactic Acid Russell  4.7 H*  2.2 H*  (0.7-2.0)  mmol/L


 


Total Protein    (6.3-8.2)  g/dL














Thrombosis Risk Factor Assmnt





- Choose All That Apply


Each Risk Factor Represents 2 Points: Age 61-74 years


Thrombosis Risk Factor Assessment Total Risk Factor Score: 2


Thrombosis Risk Factor Assessment Level: Low Risk

## 2020-12-01 NOTE — US
EXAMINATION TYPE: US kidneys/renal and bladder

 

DATE OF EXAM: 12/1/2020

 

COMPARISON: NONE

 

CLINICAL HISTORY: assess for CK D. Abnormal labs

 

EXAM MEASUREMENTS:

 

Right Kidney:  10.1 x 4.4 x 4.5 cm

Left Kidney: 10.1 x 4.5 x 4.2 cm

 

 

 

 

Right Kidney: Appeared wnl  

Left Kidney: Appeared wnl  

Bladder: wnl

**Bilateral Jets seen: Only left jet visualized

 

There is no evidence for hydronephrosis at this point in time.  No nephrolithiasis is seen.  No chiara
s are identified.  The urinary bladder is anechoic.  Bilateral ureteral jets are seen. Cortical medul
jean-claude differentiation preserved. Cortical thickness preserved.

 

 

 

IMPRESSION:

No abnormality noted.

## 2020-12-01 NOTE — P.CNPUL
History of Present Illness


Reason for consult: dyspnea, cough, hypoxemia


Chief complaint: Altered mental status


History of present illness: 





This is a 65-year-old female with advanced multiple sclerosis, patient was 

brought into the hospital after she was found to be unresponsive on medical floo

r he was cyanotic, patient placed back on BiPAP with slow improvement in 

clinical condition have diffuse wheezing as well continue snaps treatment was 

given in the emergency department, subsequently admitted to the floor, patient 

was evaluated on medical floor she was currently 5 L oxygen she feels that she 

is back to baseline, denies any chest pain shortness of breath does have some 

cough, BiPAP is off, data as up been obtained from the patient that she was 

smoking in the garage according to her 5 cigarettes were smoked after that she 

went inside the room and passed out, EMS were notified and brought into hospital

for further evaluation, her past history significant for multiple sclerosis m

ajor depression anxiety disorder, likely severe COPD, hypertension hypertensive 

cardiovascular disease, she smokes 1-1/2 pack per day every day





Review of Systems


All systems: negative





Past Medical History


Past Medical History: Hypertension, Neurologic Disorder


Additional Past Medical History / Comment(s): HTN, MS


History of Any Multi-Drug Resistant Organisms: None Reported


Past Surgical History: Tonsillectomy


Past Psychological History: Anxiety


Smoking Status: Current every day smoker


Past Alcohol Use History: Rare


Past Drug Use History: None Reported





Medications and Allergies


                                Home Medications











 Medication  Instructions  Recorded  Confirmed  Type


 


Escitalopram [Lexapro] 20 mg PO DAILY 01/29/16 11/30/20 History


 


ALPRAZolam [Xanax] 0.5 mg PO HS PRN 06/02/19 11/30/20 History


 


ALPRAZolam [Xanax] 0.5 mg PO TID 06/02/19 11/30/20 History


 


Acetaminophen Tab [Tylenol Tab] 1,000 mg PO DAILY PRN 06/02/19 11/30/20 History


 


Ibuprofen [Motrin] 800 mg PO Q6H PRN 06/02/19 11/30/20 History


 


Meclizine [Antivert] 25 mg PO TID PRN 06/02/19 11/30/20 History


 


Ondansetron Odt [Zofran Odt] 4 mg PO Q12HR PRN 06/02/19 11/30/20 History


 


amLODIPine BESYLATE/BENAZEPRIL 1 cap PO HS 06/02/19 11/30/20 History





[Lotrel 10-40 MG]    


 


Budesonide/Formoterol Fumarate 1 puff INHALATION RT-BID 11/30/20 11/30/20 

History





[Symbicort 160-4.5 Mcg Inhaler]    


 


Furosemide [Lasix] 20 mg PO DAILY 11/30/20 11/30/20 History


 


Metoprolol Tartrate [Lopressor] 25 mg PO DAILY 11/30/20 11/30/20 History


 


OLANZapine [ZyPREXA] 10 mg PO DAILY 11/30/20 11/30/20 History


 


Omeprazole 20 mg PO DAILY 11/30/20 11/30/20 History


 


Teriflunomide [Aubagio] 7 mg PO DAILY 11/30/20 11/30/20 History








                                    Allergies











Allergy/AdvReac Type Severity Reaction Status Date / Time


 


No Known Allergies Allergy   Verified 06/02/19 11:30














Physical Exam


Vitals: 


                                   Vital Signs











  Temp Pulse Pulse Resp BP BP Pulse Ox


 


 12/01/20 16:07   88     


 


 12/01/20 15:52   92     


 


 12/01/20 15:31  97.8 F   90  18   110/75  90 L


 


 12/01/20 12:06   87     


 


 12/01/20 12:00    84  19   118/70  94 L


 


 12/01/20 11:56   86     


 


 12/01/20 08:28   96     


 


 12/01/20 08:11   91     


 


 12/01/20 08:00    75  18   102/67  95


 


 12/01/20 07:01     18    98


 


 12/01/20 06:30     18    98


 


 12/01/20 05:21   104 H     


 


 12/01/20 05:14   96     


 


 12/01/20 04:00  98.1 F   96  18    95


 


 12/01/20 02:00     20   


 


 12/01/20 01:32  98.0 F   95  20   123/79  93 L


 


 11/30/20 20:00   98   18  93/59   93 L








                                Intake and Output











 12/01/20 12/01/20 12/01/20





 06:59 14:59 22:59


 


Intake Total 250 10 


 


Balance 250 10 


 


Intake:   


 


  IV  10 


 


    Invasive Line 1  10 


 


  Oral 250  


 


Other:   


 


  Voiding Method Toilet  


 


  # Voids 0  0


 


  # Bowel Movements   0


 


  Weight 67.132 kg  














- Constitutional


General appearance: average body habitus, cooperative, disheveled, mild distress





- EENT


Eyes: EOMI, PERRLA


Ears: bilateral: normal





- Neck


Neck: normal ROM


Carotids: bilateral: upstroke normal


Thyroid: bilateral: normal size





- Respiratory


Respiratory: bilateral: diminished





- Cardiovascular


Rhythm: regular


Heart sounds: normal: S1, S2





- Gastrointestinal


General gastrointestinal: normal bowel sounds, soft





- Integumentary


Integumentary: decreased turgor, flushed





- Neurologic


Neurologic: CNII-XII intact





- Musculoskeletal


Musculoskeletal: gait normal, generalized weakness





- Psychiatric


Psychiatric: A&O x's 3, appropriate affect, intact judgment & insight





Results





- Laboratory Findings


CBC and BMP: 


                                 11/30/20 16:10





                                 11/30/20 16:10


PT/INR, D-dimer











PT  9.4 sec (9.0-12.0)   11/30/20  16:10    


 


INR  0.9  (<1.2)   11/30/20  16:10    








Abnormal lab findings: 


                                  Abnormal Labs











  11/30/20 11/30/20 11/30/20





  16:10 16:10 16:10


 


WBC  11.8 H  


 


RBC  5.80 H  


 


Hct  49.8 H  


 


Neutrophils #  10.2 H  


 


APTT   18.3 L 


 


Sodium    134 L


 


Chloride    94 L


 


BUN    18 H


 


Creatinine    1.67 H


 


Glucose    166 H


 


Plasma Lactic Acid Russell   


 


Total Protein    8.9 H














  11/30/20 11/30/20





  16:10 18:58


 


WBC  


 


RBC  


 


Hct  


 


Neutrophils #  


 


APTT  


 


Sodium  


 


Chloride  


 


BUN  


 


Creatinine  


 


Glucose  


 


Plasma Lactic Acid Russell  4.7 H*  2.2 H*


 


Total Protein  














- Diagnostic Findings


Chest x-ray: report reviewed (Finding as noted above no acute processes 

identified), image reviewed





Assessment and Plan


Assessment: 


Altered mental status





Suspect carbon monoxide poisoning





Severe COPD with possible exacerbation





History of multiple sclerosis





Generalized anxiety disorder





Activities smoker one had a half pack per day





Plan: 





Continue oxygen as we will attempt to wean it down slowly in next 12-24 hours





Continue bronchodilators monitor observe patient off of steroids antibiotics





Patient will need smoking cessation counseling and advice also education about 

carbon monoxide poisoning





Further workup and evaluation pending as per clinical response of the patient


Time with Patient: Greater than 30

## 2020-12-02 RX ADMIN — IPRATROPIUM BROMIDE AND ALBUTEROL SULFATE SCH ML: .5; 3 SOLUTION RESPIRATORY (INHALATION) at 00:30

## 2020-12-02 RX ADMIN — METOPROLOL TARTRATE SCH MG: 25 TABLET, FILM COATED ORAL at 09:57

## 2020-12-02 RX ADMIN — FORMOTEROL FUMARATE DIHYDRATE SCH MCG: 20 SOLUTION RESPIRATORY (INHALATION) at 08:43

## 2020-12-02 RX ADMIN — PRAMIPEXOLE DIHYDROCHLORIDE SCH MG: 0.5 TABLET ORAL at 09:57

## 2020-12-02 RX ADMIN — NICOTINE SCH PATCH: 21 PATCH, EXTENDED RELEASE TRANSDERMAL at 09:57

## 2020-12-02 RX ADMIN — BUDESONIDE SCH: 1 SUSPENSION RESPIRATORY (INHALATION) at 20:41

## 2020-12-02 RX ADMIN — PRAMIPEXOLE DIHYDROCHLORIDE SCH MG: 0.5 TABLET ORAL at 21:03

## 2020-12-02 RX ADMIN — IPRATROPIUM BROMIDE AND ALBUTEROL SULFATE SCH ML: .5; 3 SOLUTION RESPIRATORY (INHALATION) at 20:43

## 2020-12-02 RX ADMIN — FORMOTEROL FUMARATE DIHYDRATE SCH: 20 SOLUTION RESPIRATORY (INHALATION) at 20:41

## 2020-12-02 RX ADMIN — ASPIRIN SCH: 325 TABLET ORAL at 09:54

## 2020-12-02 RX ADMIN — PANTOPRAZOLE SODIUM SCH MG: 40 TABLET, DELAYED RELEASE ORAL at 06:36

## 2020-12-02 RX ADMIN — IPRATROPIUM BROMIDE AND ALBUTEROL SULFATE SCH ML: .5; 3 SOLUTION RESPIRATORY (INHALATION) at 08:43

## 2020-12-02 RX ADMIN — ENOXAPARIN SODIUM SCH MG: 40 INJECTION SUBCUTANEOUS at 09:57

## 2020-12-02 RX ADMIN — IPRATROPIUM BROMIDE AND ALBUTEROL SULFATE SCH ML: .5; 3 SOLUTION RESPIRATORY (INHALATION) at 11:41

## 2020-12-02 RX ADMIN — IPRATROPIUM BROMIDE AND ALBUTEROL SULFATE SCH ML: .5; 3 SOLUTION RESPIRATORY (INHALATION) at 04:24

## 2020-12-02 RX ADMIN — ESCITALOPRAM OXALATE SCH MG: 20 TABLET, FILM COATED ORAL at 09:57

## 2020-12-02 RX ADMIN — IPRATROPIUM BROMIDE AND ALBUTEROL SULFATE SCH ML: .5; 3 SOLUTION RESPIRATORY (INHALATION) at 15:59

## 2020-12-02 RX ADMIN — BUDESONIDE SCH MG: 1 SUSPENSION RESPIRATORY (INHALATION) at 08:43

## 2020-12-02 NOTE — P.PN
Progress Note - Text


Progress Note Date: 12/02/20





Chief Complaint: Short of breath





History of presenting complaint:


This is a pleasant 65-year-old patient of visiting physicians Dr. Monteiro.  

Chronic stable medical conditions include hypertension, multiple sclerosis, 

anxiety depression, hypertension, urinary incontinence.  Long-standing smoker.  

Daughter is at the bedside.  Last 2-3 days patient had getting increasingly 

short of breath.  Increasing wheezing.  And out of the house to smoke today came

back, and the last she remembers sitting on the sofa.  She was found on the 

carpeted floor.  She is very cyanotic and hypoxic and as per the EMS pulse ox 

was 60%.  Patient put on a BiPAP in the ER and patient recovered.  And after 

bronchodilators feeling better.  Still wheezing.  Has not followed with any 

doctor.  No seizure activity was reported.  No chest pain.


Admitted with-acute COPD exacerbation, acute hypoxic and hypercapnic respiratory

failure, acute metabolic encephalopathy from above.  Started on bronchodilators,

steroids.


Today-some improvement in short of breath and wheezing.  Eating some food.  Sl

ight cough.  Tired





Review of systems: Was done for constitutional, cardiovascular, GI, pulmonary. 

relevant finding as above





Active Medications





Albuterol/Ipratropium (Ipratropium-Albuterol 3 Ml Neb)  3 ml INHALATION RT-Q4H 

PRN


   PRN Reason: Shortness Of Breath Or Wheezing


   Last Admin: 12/01/20 11:55 Dose:  3 ml


   Documented by: 


Albuterol/Ipratropium (Ipratropium-Albuterol 3 Ml Neb)  3 ml INHALATION RT-Q4H 

Critical access hospital


   Last Admin: 12/02/20 15:59 Dose:  3 ml


   Documented by: 


Alprazolam (Alprazolam 0.5 Mg Tab)  0.5 mg PO HS PRN


   PRN Reason: Insomnia


   Last Admin: 12/02/20 03:21 Dose:  0.5 mg


   Documented by: 


Alprazolam (Alprazolam 0.5 Mg Tab)  0.5 mg PO TID [facility]


   Last Admin: 12/02/20 17:03 Dose:  0.5 mg


   Documented by: 


Budesonide (Budesonide 1 Mg/2 Ml Nebu)  1 mg INHALATION RT-BID [facility] hospital


   Last Admin: 12/02/20 08:43 Dose:  1 mg


   Documented by: 


Enoxaparin Sodium (Enoxaparin 40 Mg/0.4 Ml Syringe)  40 mg SQ DAILY [facility] hospital


   Last Admin: 12/02/20 09:57 Dose:  40 mg


   Documented by: 


Escitalopram Oxalate (Escitalopram 20 Mg Tab)  20 mg PO DAILY [facility]


   Last Admin: 12/02/20 09:57 Dose:  20 mg


   Documented by: 


Formoterol Fumarate (Formoterol Fumarate 20 Mcg/2 Ml Nebu)  20 mcg INHALATION R

T-BID [facility] hospital


   Last Admin: 12/02/20 08:43 Dose:  20 mcg


   Documented by: 


Metoprolol Tartrate (Metoprolol Tartrate 25 Mg Tab)  25 mg PO DAILY [facility]


   Last Admin: 12/02/20 09:57 Dose:  25 mg


   Documented by: 


Nicotine (Nicotine 21mg/24hr Patch)  1 patch TRANSDERM DAILY [facility] hospital


   Last Admin: 12/02/20 09:57 Dose:  1 patch


   Documented by: 


Patient's Own ( Teriflunomide [ Aubagio] 7 Mg Tablet )  7 mg PO DAILY [facility]


   Last Admin: 12/02/20 09:54 Dose:  Not Given


   Documented by: 


Olanzapine (Olanzapine 10 Mg Tab)  10 mg PO DAILY [facility]


   Last Admin: 12/02/20 09:57 Dose:  10 mg


   Documented by: 


Ondansetron HCl (Ondansetron Odt 4 Mg Tab)  4 mg PO Q12HR PRN


   PRN Reason: Nausea


Pantoprazole Sodium (Pantoprazole 40 Mg Tablet)  40 mg PO [id number] [facility] hospital


   Last Admin: 12/02/20 06:36 Dose:  40 mg


   Documented by: 


Pramipexole Dihydrochloride (Pramipexole 0.5 Mg Tab)  0.5 mg PO BID [facility] hospital


   Last Admin: 12/02/20 09:57 Dose:  0.5 mg


   Documented by: 











Physical examination:


VITAL SIGNS: 98.4, 100, 20, 109/68, 91% on 5 L


GENERAL: Sitting up in bed, that short of breath


EYES: Pupils equal.  Conjunctiva normal.


NECK: JVD not raised; masses not palpable.


HEART: First and second heart sounds are normal;  no edema.  


LUNGS: Respiratory rate increased, diminished breath sound prominent expiration 

-less wheezing.  


ABDOMEN: Soft,  nontender, liver spleen not palpable, no masses palpable.  


PSYCH: [Alert and oriented x3;  mood  and affect anxious .  








INVESTIGATIONS, reviewed in the clinical context:


White count 11.8 hemoglobin 15.5 platelets 249 potassium 4.6 bun 18 creatinine 

1.67


Lactic acid 4.7


Troponin I less than 0.012


Coronavirus P/Cr-not detected


EKG tracing personally reviewed by me-normal sinus rhythm, P pulmonale


Chest x-ray film personally reviewed by me-no obvious infiltrate.  Prominent 

pulmonary artery





Assessment:


-Acute severe COPD exacerbation in a current smoker-slow to respond


-Acute hypoxic, suspect hypercapnic respiratory failure, POA


-Acute metabolic encephalopathy from CO2 narcosis likely on presentation-

resolved


-Chronic nicotine dependence patient active cigarette smoker


-Essential hypertension


-Chronic urinary incontinence


-Anxiety depression not otherwise specified


-Restless leg syndrome started on Mirapex


-Abnormal kidney function, rule out the chronic component





Plan:


Continue nebulized bronchodilators, inhaled and IV steroids.  Care was discussed

 with the patient.  Encouraged to sit up in a chair.  Repeat labs in the 

morning.

## 2020-12-02 NOTE — P.PN
Subjective


Progress Note Date: 12/02/20


Principal diagnosis: 





Altered mental status





Suspect carbon monoxide poisoning





Severe COPD with possible exacerbation





History of multiple sclerosis





Generalized anxiety disorder





Activities smoker one had a half pack per day





12/02/2020, patient is awake and alert sitting upright in the bed breathing 

comfortably remains on 5 L oxygen, denies any chest pain denies any cough or 

sputum production, scans were daughter at length about sequence of events are 

slightly disorder breathing and COPD with the risk of exposure to carbon 

monoxide and to minimize the risk as well





This is a 65-year-old female with advanced multiple sclerosis, patient was 

brought into the hospital after she was found to be unresponsive on medical 

floor he was cyanotic, patient placed back on BiPAP with slow improvement in 

clinical condition have diffuse wheezing as well continue snaps treatment was 

given in the emergency department, subsequently admitted to the floor, patient 

was evaluated on medical floor she was currently 5 L oxygen she feels that she 

is back to baseline, denies any chest pain shortness of breath does have some 

cough, BiPAP is off, data as up been obtained from the patient that she was 

smoking in the garage according to her 5 cigarettes were smoked after that she 

went inside the room and passed out, EMS were notified and brought into hospital

for further evaluation, her past history significant for multiple sclerosis 

major depression anxiety disorder, likely severe COPD, hypertension hypertensive

cardiovascular disease, she smokes 1-1/2 pack per day every day








Objective





- Vital Signs


Vital signs: 


                                   Vital Signs











Temp  98.0 F   12/02/20 00:00


 


Pulse  100   12/02/20 09:12


 


Resp  17   12/02/20 03:52


 


BP  138/80   12/02/20 03:52


 


Pulse Ox  91 L  12/02/20 03:52








                                 Intake & Output











 12/01/20 12/02/20 12/02/20





 18:59 06:59 18:59


 


Intake Total 10 480 120


 


Output Total 300 600 


 


Balance -290 -120 120


 


Weight  64 kg 


 


Intake:   


 


  IV 10  


 


    Invasive Line 1 10  


 


  Oral  480 120


 


Output:   


 


  Urine 300 600 


 


Other:   


 


  Voiding Method  Toilet 





  Diaper 


 


  # Voids 2 1 


 


  # Bowel Movements 0 0 














- Exam





- Constitutional


General appearance: average body habitus, cooperative, disheveled, mild distress





- EENT


Eyes: EOMI, PERRLA


Ears: bilateral: normal





- Neck


Neck: normal ROM


Carotids: bilateral: upstroke normal


Thyroid: bilateral: normal size





- Respiratory


Respiratory: bilateral: diminished





- Cardiovascular


Rhythm: regular


Heart sounds: normal: S1, S2





- Gastrointestinal


General gastrointestinal: normal bowel sounds, soft





- Integumentary


Integumentary: decreased turgor, flushed





- Neurologic


Neurologic: CNII-XII intact





- Musculoskeletal


Musculoskeletal: gait normal, generalized weakness





- Psychiatric


Psychiatric: A&O x's 3, appropriate affect, intact judgment & insight








- Labs


CBC & Chem 7: 


                                 11/30/20 16:10





                                 11/30/20 16:10





Assessment and Plan


Assessment: 


Altered mental status





Suspect carbon monoxide poisoning





Severe COPD with possible exacerbation





History of multiple sclerosis





Generalized anxiety disorder





Activities smoker one had a half pack per day





Plan: 





Continue oxygen as we will attempt to wean it down slowly to room air





Continue bronchodilators monitor observe patient off of steroids antibiotics





smoking cessation counseling and advice also education about carbon monoxide 

poisoning also discussed with daughter at length





Further workup and evaluation pending as per clinical response of the patient


Time with Patient: Greater than 30

## 2020-12-03 LAB
ANION GAP SERPL CALC-SCNC: 1 MMOL/L
BUN SERPL-SCNC: 20 MG/DL (ref 7–17)
CALCIUM SPEC-MCNC: 9.3 MG/DL (ref 8.4–10.2)
CHLORIDE SERPL-SCNC: 102 MMOL/L (ref 98–107)
CO2 SERPL-SCNC: 33 MMOL/L (ref 22–30)
ERYTHROCYTE [DISTWIDTH] IN BLOOD BY AUTOMATED COUNT: 4.9 M/UL (ref 3.8–5.4)
ERYTHROCYTE [DISTWIDTH] IN BLOOD: 14.3 % (ref 11.5–15.5)
GLUCOSE SERPL-MCNC: 83 MG/DL (ref 74–99)
HCT VFR BLD AUTO: 40.7 % (ref 34–46)
HGB BLD-MCNC: 13.4 GM/DL (ref 11.4–16)
MCH RBC QN AUTO: 27.3 PG (ref 25–35)
MCHC RBC AUTO-ENTMCNC: 32.9 G/DL (ref 31–37)
MCV RBC AUTO: 83.1 FL (ref 80–100)
PLATELET # BLD AUTO: 185 K/UL (ref 150–450)
POTASSIUM SERPL-SCNC: 3.9 MMOL/L (ref 3.5–5.1)
SODIUM SERPL-SCNC: 136 MMOL/L (ref 137–145)
WBC # BLD AUTO: 7.5 K/UL (ref 3.8–10.6)

## 2020-12-03 RX ADMIN — BUDESONIDE SCH MG: 1 SUSPENSION RESPIRATORY (INHALATION) at 19:42

## 2020-12-03 RX ADMIN — ESCITALOPRAM OXALATE SCH MG: 20 TABLET, FILM COATED ORAL at 08:58

## 2020-12-03 RX ADMIN — IPRATROPIUM BROMIDE AND ALBUTEROL SULFATE SCH ML: .5; 3 SOLUTION RESPIRATORY (INHALATION) at 08:36

## 2020-12-03 RX ADMIN — NICOTINE SCH PATCH: 21 PATCH, EXTENDED RELEASE TRANSDERMAL at 08:58

## 2020-12-03 RX ADMIN — FORMOTEROL FUMARATE DIHYDRATE SCH MCG: 20 SOLUTION RESPIRATORY (INHALATION) at 19:42

## 2020-12-03 RX ADMIN — ASPIRIN SCH: 325 TABLET ORAL at 08:59

## 2020-12-03 RX ADMIN — FORMOTEROL FUMARATE DIHYDRATE SCH MCG: 20 SOLUTION RESPIRATORY (INHALATION) at 08:36

## 2020-12-03 RX ADMIN — IPRATROPIUM BROMIDE AND ALBUTEROL SULFATE SCH: .5; 3 SOLUTION RESPIRATORY (INHALATION) at 06:24

## 2020-12-03 RX ADMIN — METHYLPREDNISOLONE SODIUM SUCCINATE SCH MG: 40 INJECTION, POWDER, FOR SOLUTION INTRAMUSCULAR; INTRAVENOUS at 20:47

## 2020-12-03 RX ADMIN — PRAMIPEXOLE DIHYDROCHLORIDE SCH MG: 0.5 TABLET ORAL at 20:48

## 2020-12-03 RX ADMIN — IPRATROPIUM BROMIDE AND ALBUTEROL SULFATE SCH ML: .5; 3 SOLUTION RESPIRATORY (INHALATION) at 16:35

## 2020-12-03 RX ADMIN — IPRATROPIUM BROMIDE AND ALBUTEROL SULFATE SCH ML: .5; 3 SOLUTION RESPIRATORY (INHALATION) at 00:03

## 2020-12-03 RX ADMIN — METOPROLOL TARTRATE SCH MG: 25 TABLET, FILM COATED ORAL at 08:58

## 2020-12-03 RX ADMIN — ENOXAPARIN SODIUM SCH MG: 40 INJECTION SUBCUTANEOUS at 08:58

## 2020-12-03 RX ADMIN — IPRATROPIUM BROMIDE AND ALBUTEROL SULFATE SCH ML: .5; 3 SOLUTION RESPIRATORY (INHALATION) at 12:37

## 2020-12-03 RX ADMIN — PANTOPRAZOLE SODIUM SCH MG: 40 TABLET, DELAYED RELEASE ORAL at 06:45

## 2020-12-03 RX ADMIN — IPRATROPIUM BROMIDE AND ALBUTEROL SULFATE SCH ML: .5; 3 SOLUTION RESPIRATORY (INHALATION) at 19:42

## 2020-12-03 RX ADMIN — BUDESONIDE SCH MG: 1 SUSPENSION RESPIRATORY (INHALATION) at 08:37

## 2020-12-03 RX ADMIN — PRAMIPEXOLE DIHYDROCHLORIDE SCH MG: 0.5 TABLET ORAL at 08:58

## 2020-12-03 NOTE — P.PN
Subjective


Progress Note Date: 12/03/20


Principal diagnosis: 





Altered mental status





Suspect carbon monoxide poisoning





Severe COPD with possible exacerbation





History of multiple sclerosis





Generalized anxiety disorder





Activities smoker one had a half pack per day





12/03/2020, patient seen eval examined during the rounds labs reviewed 

medications reviewed care plan discussed, patient is undergoing physical therapy

and rehab, is on 5 L oxygen, no chest pain is present awake and alert breathing 

comfortably, noted that oxygen saturation were lower now compared to before as 

yesterday patient was on 5 L saturation is 96% today came down to 90% discussed 

with RN will try to taper down oxygen with frequently monitor oxygen saturation





12/02/2020, patient is awake and alert sitting upright in the bed breathing 

comfortably remains on 5 L oxygen, denies any chest pain denies any cough or 

sputum production, scans were daughter at length about sequence of events are 

slightly disorder breathing and COPD with the risk of exposure to carbon 

monoxide and to minimize the risk as well





This is a 65-year-old female with advanced multiple sclerosis, patient was 

brought into the hospital after she was found to be unresponsive on medical 

floor he was cyanotic, patient placed back on BiPAP with slow improvement in 

clinical condition have diffuse wheezing as well continue snaps treatment was 

given in the emergency department, subsequently admitted to the floor, patient 

was evaluated on medical floor she was currently 5 L oxygen she feels that she 

is back to baseline, denies any chest pain shortness of breath does have some 

cough, BiPAP is off, data as up been obtained from the patient that she was 

smoking in the garage according to her 5 cigarettes were smoked after that she 

went inside the room and passed out, EMS were notified and brought into hospital

for further evaluation, her past history significant for multiple sclerosis 

major depression anxiety disorder, likely severe COPD, hypertension hypertensive

cardiovascular disease, she smokes 1-1/2 pack per day every day








Objective





- Vital Signs


Vital signs: 


                                   Vital Signs











Temp  97.7 F   12/02/20 20:00


 


Pulse  100   12/03/20 09:00


 


Resp  20   12/03/20 04:00


 


BP  134/77   12/03/20 04:00


 


Pulse Ox  90 L  12/03/20 04:00








                                 Intake & Output











 12/02/20 12/03/20 12/03/20





 18:59 06:59 18:59


 


Intake Total 596  120


 


Balance 596  120


 


Weight  120 kg 


 


Intake:   


 


  Oral 596  120


 


Other:   


 


  Voiding Method  Bedside Commode 





  Diaper 


 


  # Voids 2 2 














- Exam





- Constitutional


General appearance: average body habitus, cooperative, disheveled, mild distress





- EENT


Eyes: EOMI, PERRLA


Ears: bilateral: normal





- Neck


Neck: normal ROM


Carotids: bilateral: upstroke normal


Thyroid: bilateral: normal size





- Respiratory


Respiratory: bilateral: diminished





- Cardiovascular


Rhythm: regular


Heart sounds: normal: S1, S2





- Gastrointestinal


General gastrointestinal: normal bowel sounds, soft





- Integumentary


Integumentary: decreased turgor, flushed





- Neurologic


Neurologic: CNII-XII intact





- Musculoskeletal


Musculoskeletal: gait normal, generalized weakness





- Psychiatric


Psychiatric: A&O x's 3, appropriate affect, intact judgment & insight








- Labs


CBC & Chem 7: 


                                 11/30/20 16:10





                                 11/30/20 16:10





Assessment and Plan


Assessment: 


Altered mental status





carbon monoxide poisoning





Severe COPD with possible exacerbation





History of multiple sclerosis





Generalized anxiety disorder





Activities smoker one had a half pack per day





Plan: 





Continue oxygen as we will attempt to wean it down slowly to room air, may need 

to get a radiographic imaging of the chest in case if unable to taper down the 

oxygen





Continue bronchodilators monitor observe patient off of steroids antibiotics





smoking cessation counseling and advice also education about carbon monoxide poi

soning also discussed with daughter at length





Further workup and evaluation pending as per clinical response of the patient


Time with Patient: Greater than 30

## 2020-12-03 NOTE — P.PN
Progress Note - Text


Progress Note Date: 12/03/20





Chief Complaint: Short of breath





History of presenting complaint:


This is a pleasant 65-year-old patient of visiting physicians Dr. Monteiro.  

Chronic stable medical conditions include hypertension, multiple sclerosis, 

anxiety depression, hypertension, urinary incontinence.  Long-standing smoker.  

Daughter is at the bedside.  Last 2-3 days patient had getting increasingly 

short of breath.  Increasing wheezing.  And out of the house to smoke today came

back, and the last she remembers sitting on the sofa.  She was found on the 

carpeted floor.  She is very cyanotic and hypoxic and as per the EMS pulse ox 

was 60%.  Patient put on a BiPAP in the ER and patient recovered.  And after 

bronchodilators feeling better.  Still wheezing.  Has not followed with any 

doctor.  No seizure activity was reported.  No chest pain.


Admitted with-acute COPD exacerbation, acute hypoxic and hypercapnic respiratory

failure, acute metabolic encephalopathy from above.  Started on bronchodilators,

steroids.


Today-Some improvement in shortness of breath wheezing.  Still still having so

me.  Has been out of bed.  Oral intake fair





Review of systems: Was done for constitutional, cardiovascular, GI, pulmonary. 

relevant finding as above





Active Medications





Albuterol/Ipratropium (Ipratropium-Albuterol 3 Ml Neb)  3 ml INHALATION RT-Q4H 

PRN


   PRN Reason: Shortness Of Breath Or Wheezing


   Last Admin: 12/01/20 11:55 Dose:  3 ml


   Documented by: 


Albuterol/Ipratropium (Ipratropium-Albuterol 3 Ml Neb)  3 ml INHALATION RT-Q4H 

[facility]


   Last Admin: 12/03/20 19:42 Dose:  3 ml


   Documented by: 


Alprazolam (Alprazolam 0.5 Mg Tab)  0.5 mg PO HS PRN


   PRN Reason: Insomnia


   Last Admin: 12/02/20 03:21 Dose:  0.5 mg


   Documented by: 


Alprazolam (Alprazolam 0.5 Mg Tab)  0.5 mg PO TID [facility]


   Last Admin: 12/03/20 17:09 Dose:  0.5 mg


   Documented by: 


Budesonide (Budesonide 1 Mg/2 Ml Nebu)  1 mg INHALATION RT-BID [facility]


   Last Admin: 12/03/20 19:42 Dose:  1 mg


   Documented by: 


Docusate Sodium (Docusate 100 Mg Cap)  100 mg PO DAILY [facility]


Enoxaparin Sodium (Enoxaparin 40 Mg/0.4 Ml Syringe)  40 mg SQ DAILY [facility]


   Last Admin: 12/03/20 08:58 Dose:  40 mg


   Documented by: 


Escitalopram Oxalate (Escitalopram 20 Mg Tab)  20 mg PO DAILY [facility]


   Last Admin: 12/03/20 08:58 Dose:  20 mg


   Documented by: 


Formoterol Fumarate (Formoterol Fumarate 20 Mcg/2 Ml Nebu)  20 mcg INHALATION 

RT-BID [facility]


   Last Admin: 12/03/20 19:42 Dose:  20 mcg


   Documented by: 


Metoprolol Tartrate (Metoprolol Tartrate 25 Mg Tab)  25 mg PO DAILY [facility]


   Last Admin: 12/03/20 08:58 Dose:  25 mg


   Documented by: 


Nicotine (Nicotine 21mg/24hr Patch)  1 patch TRANSDERM DAILY [facility]


   Last Admin: 12/03/20 08:58 Dose:  1 patch


   Documented by: 


Olanzapine (Olanzapine 10 Mg Tab)  10 mg PO DAILY [facility]


   Last Admin: 12/03/20 08:58 Dose:  10 mg


   Documented by: 


Ondansetron HCl (Ondansetron Odt 4 Mg Tab)  4 mg PO Q12HR PRN


   PRN Reason: Nausea


Pantoprazole Sodium (Pantoprazole 40 Mg Tablet)  40 mg PO AC-BRKFST [facility]


   Last Admin: 12/03/20 06:45 Dose:  40 mg


   Documented by: 


Pramipexole Dihydrochloride (Pramipexole 0.5 Mg Tab)  0.5 mg PO BID [facility]


   Last Admin: 12/03/20 08:58 Dose:  0.5 mg


   Documented by: 











Physical examination:


VITAL SIGNS: 98.3, 84, 20, 100/67, 89% on 5 L


GENERAL: Sitting up in bed, some short of breath


EYES: Pupils equal.  Conjunctiva normal.


NECK: JVD not raised; masses not palpable.


HEART: First and second heart sounds are normal;  no edema.  


LUNGS: Respiratory rate increased, diminished breath sound prolonged expiration 

-some wheezing.  


ABDOMEN: Soft,  nontender, liver spleen not palpable, no masses palpable.  


PSYCH: [Alert and oriented x3;  mood  and affect anxious .  








INVESTIGATIONS, reviewed in the clinical context:


Creatinine 0.86


Previous testing:


White count 11.8 hemoglobin 15.5 platelets 249 potassium 4.6 bun 18 creatinine 

1.67


Lactic acid 4.7


Troponin I less than 0.012


Coronavirus P/Cr-not detected


EKG tracing personally reviewed by me-normal sinus rhythm, P pulmonale


Chest x-ray film personally reviewed by me-no obvious infiltrate.  Prominent 

pulmonary artery





Assessment:


-Acute severe COPD exacerbation in a current smoker-slow to respond


-Acute hypoxic, suspect hypercapnic respiratory failure, POA


-Acute metabolic encephalopathy from CO2 narcosis likely on presentation-

resolved


-Chronic nicotine dependence patient active cigarette smoker


-Essential hypertension


-Chronic urinary incontinence


-Anxiety depression not otherwise specified


-Restless leg syndrome started on Mirapex


-Acute kidney injury likely ATN and prerenal-POA-resolved





Plan:


Continue nebulized bronchodilators, inhaled and IV steroids.  Care was discussed

 with the patient.  Expected patient to be hospital for at least 1-2 days.

## 2020-12-04 RX ADMIN — IPRATROPIUM BROMIDE AND ALBUTEROL SULFATE SCH ML: .5; 3 SOLUTION RESPIRATORY (INHALATION) at 20:33

## 2020-12-04 RX ADMIN — BUDESONIDE SCH MG: 1 SUSPENSION RESPIRATORY (INHALATION) at 08:27

## 2020-12-04 RX ADMIN — BUDESONIDE SCH MG: 1 SUSPENSION RESPIRATORY (INHALATION) at 20:32

## 2020-12-04 RX ADMIN — NICOTINE SCH PATCH: 21 PATCH, EXTENDED RELEASE TRANSDERMAL at 08:57

## 2020-12-04 RX ADMIN — PRAMIPEXOLE DIHYDROCHLORIDE SCH MG: 0.5 TABLET ORAL at 08:58

## 2020-12-04 RX ADMIN — METHYLPREDNISOLONE SODIUM SUCCINATE SCH MG: 40 INJECTION, POWDER, FOR SOLUTION INTRAMUSCULAR; INTRAVENOUS at 05:19

## 2020-12-04 RX ADMIN — METOPROLOL TARTRATE SCH MG: 25 TABLET, FILM COATED ORAL at 08:58

## 2020-12-04 RX ADMIN — IPRATROPIUM BROMIDE AND ALBUTEROL SULFATE SCH ML: .5; 3 SOLUTION RESPIRATORY (INHALATION) at 11:55

## 2020-12-04 RX ADMIN — IPRATROPIUM BROMIDE AND ALBUTEROL SULFATE SCH ML: .5; 3 SOLUTION RESPIRATORY (INHALATION) at 08:27

## 2020-12-04 RX ADMIN — IPRATROPIUM BROMIDE AND ALBUTEROL SULFATE SCH ML: .5; 3 SOLUTION RESPIRATORY (INHALATION) at 00:33

## 2020-12-04 RX ADMIN — IPRATROPIUM BROMIDE AND ALBUTEROL SULFATE SCH ML: .5; 3 SOLUTION RESPIRATORY (INHALATION) at 16:38

## 2020-12-04 RX ADMIN — IPRATROPIUM BROMIDE AND ALBUTEROL SULFATE SCH ML: .5; 3 SOLUTION RESPIRATORY (INHALATION) at 03:17

## 2020-12-04 RX ADMIN — ESCITALOPRAM OXALATE SCH MG: 20 TABLET, FILM COATED ORAL at 08:58

## 2020-12-04 RX ADMIN — PRAMIPEXOLE DIHYDROCHLORIDE SCH MG: 0.5 TABLET ORAL at 21:20

## 2020-12-04 RX ADMIN — METHYLPREDNISOLONE SODIUM SUCCINATE SCH: 40 INJECTION, POWDER, FOR SOLUTION INTRAMUSCULAR; INTRAVENOUS at 12:25

## 2020-12-04 RX ADMIN — ENOXAPARIN SODIUM SCH MG: 40 INJECTION SUBCUTANEOUS at 08:57

## 2020-12-04 RX ADMIN — FORMOTEROL FUMARATE DIHYDRATE SCH MCG: 20 SOLUTION RESPIRATORY (INHALATION) at 08:27

## 2020-12-04 RX ADMIN — DOCUSATE SODIUM SCH MG: 100 CAPSULE, LIQUID FILLED ORAL at 22:59

## 2020-12-04 RX ADMIN — PANTOPRAZOLE SODIUM SCH MG: 40 TABLET, DELAYED RELEASE ORAL at 06:37

## 2020-12-04 RX ADMIN — FORMOTEROL FUMARATE DIHYDRATE SCH MCG: 20 SOLUTION RESPIRATORY (INHALATION) at 20:32

## 2020-12-04 NOTE — P.PN
Progress Note - Text


Progress Note Date: 12/04/20





Chief Complaint: Short of breath





History of presenting complaint:


This is a pleasant 65-year-old patient of visiting physicians Dr. Monteiro.  

Chronic stable medical conditions include hypertension, multiple sclerosis, 

anxiety depression, hypertension, urinary incontinence.  Long-standing smoker.  

Daughter is at the bedside.  Last 2-3 days patient had getting increasingly 

short of breath.  Increasing wheezing.  And out of the house to smoke today came

back, and the last she remembers sitting on the sofa.  She was found on the 

carpeted floor.  She is very cyanotic and hypoxic and as per the EMS pulse ox 

was 60%.  Patient put on a BiPAP in the ER and patient recovered.  And after 

bronchodilators feeling better.  Still wheezing.  Has not followed with any 

doctor.  No seizure activity was reported.  No chest pain.


Admitted with-acute COPD exacerbation, acute hypoxic and hypercapnic respiratory

failure, acute metabolic encephalopathy from above.  Started on bronchodilators,

steroids.


Today-patient very anxious to go home.  On 5 L of nasal cannula.  Discussed with

Dr. Wiseman from pulmonary.  Proceed with CT chest rule out PE.





Review of systems: Was done for constitutional, cardiovascular, GI, pulmonary. 

relevant finding as above





Active Medications





Albuterol/Ipratropium (Ipratropium-Albuterol 3 Ml Neb)  3 ml INHALATION RT-Q4H 

PRN


   PRN Reason: Shortness Of Breath Or Wheezing


   Last Admin: 12/01/20 11:55 Dose:  3 ml


   Documented by: 


Albuterol/Ipratropium (Ipratropium-Albuterol 3 Ml Neb)  3 ml INHALATION RT-Q4H 

[facility]


   Last Admin: 12/04/20 20:33 Dose:  3 ml


   Documented by: 


Alprazolam (Alprazolam 0.5 Mg Tab)  0.5 mg PO HS PRN


   PRN Reason: Insomnia


   Last Admin: 12/02/20 03:21 Dose:  0.5 mg


   Documented by: 


Alprazolam (Alprazolam 0.5 Mg Tab)  0.5 mg PO TID [facility]


   Last Admin: 12/04/20 21:19 Dose:  0.5 mg


   Documented by: 


Budesonide (Budesonide 1 Mg/2 Ml Nebu)  1 mg INHALATION RT-BID [facility]


   Last Admin: 12/04/20 20:32 Dose:  1 mg


   Documented by: 


Docusate Sodium (Docusate 100 Mg Cap)  100 mg PO DAILY [facility]


Enoxaparin Sodium (Enoxaparin 40 Mg/0.4 Ml Syringe)  40 mg SQ DAILY [facility]


   Last Admin: 12/04/20 08:57 Dose:  40 mg


   Documented by: 


Escitalopram Oxalate (Escitalopram 20 Mg Tab)  20 mg PO DAILY [facility]


   Last Admin: 12/04/20 08:58 Dose:  20 mg


   Documented by: 


Formoterol Fumarate (Formoterol Fumarate 20 Mcg/2 Ml Nebu)  20 mcg INHALATION 

RT-BID [facility]


   Last Admin: 12/04/20 20:32 Dose:  20 mcg


   Documented by: 


Metoprolol Tartrate (Metoprolol Tartrate 25 Mg Tab)  25 mg PO DAILY [facility]


   Last Admin: 12/04/20 08:58 Dose:  25 mg


   Documented by: 


Miscellaneous Information (Rx Info: Iv Contrast Was Given 1 Each Misc)  1 each 

MISCELLANE DAILY PRN


   PRN Reason: Per Protocol


   Stop: 12/06/20 12:28


Nicotine (Nicotine 21mg/24hr Patch)  1 patch TRANSDERM DAILY [facility]


   Last Admin: 12/04/20 08:57 Dose:  1 patch


   Documented by: 


Olanzapine (Olanzapine 10 Mg Tab)  10 mg PO DAILY [facility]


   Last Admin: 12/04/20 08:58 Dose:  10 mg


   Documented by: 


Ondansetron HCl (Ondansetron Odt 4 Mg Tab)  4 mg PO Q12HR PRN


   PRN Reason: Nausea


Pantoprazole Sodium (Pantoprazole 40 Mg Tablet)  40 mg PO AC-BRKFST [facility]


   Last Admin: 12/04/20 06:37 Dose:  40 mg


   Documented by: 


Pramipexole Dihydrochloride (Pramipexole 0.5 Mg Tab)  0.5 mg PO BID [facility]


   Last Admin: 12/04/20 21:20 Dose:  0.5 mg


   Documented by: 


Prednisone (Prednisone 20 Mg Tab)  60 mg PO DAILY [facility]


   Last Admin: 12/04/20 18:08 Dose:  60 mg


   Documented by: 











Physical examination:


VITAL SIGNS: 97.9, 60, 18, 117/77, 91% on 4 L


GENERAL: Sitting up in chair, breathing better


EYES: Pupils equal.  Conjunctiva normal.


NECK: JVD not raised; masses not palpable.


HEART: First and second heart sounds are normal;  no edema.  


LUNGS: Respiratory rate increased, diminished breath sound prolonged expiration 

-some wheezing.  


ABDOMEN: Soft,  nontender, liver spleen not palpable, no masses palpable.  


PSYCH: [Alert and oriented x3;  mood  and affect anxious .  








INVESTIGATIONS, reviewed in the clinical context:


Chest CT-negative for PE


Creatinine 0.86


Previous testing:


White count 11.8 hemoglobin 15.5 platelets 249 potassium 4.6 bun 18 creatinine 

1.67


Lactic acid 4.7


Troponin I less than 0.012


Coronavirus P/Cr-not detected


EKG tracing personally reviewed by me-normal sinus rhythm, P pulmonale


Chest x-ray film personally reviewed by me-no obvious infiltrate.  Prominent 

pulmonary artery





Assessment:


-Acute severe COPD exacerbation in a current improving


-Acute hypoxic, suspect hypercapnic respiratory failure, POA on 5 L nasal 

cannula.


-Acute metabolic encephalopathy from CO2 narcosis likely on presentation-

resolved


-Chronic nicotine dependence patient active cigarette smoker


-Essential hypertension


-Chronic urinary incontinence


-Anxiety depression not otherwise specified


-Restless leg syndrome started on Mirapex


-Acute kidney injury likely ATN and prerenal-POA-resolved





Plan:


Continue nebulized bronchodilators, inhaled bronchodilators.  Switch to by mouth

 prednisone.  Decrease FiO2 to 4 L.  Hopefully can be discharged home tomorrow..

## 2020-12-04 NOTE — CT
CT CHEST FOR PULMONARY EMBOLISM.

 

EXAMINATION TYPE: CT angio chest

 

DATE OF EXAM: 12/4/2020

 

INDICATION: Exacerbation of COPD

 

CT DLP: 254.3 mGycm, Automated exposure control for dose reduction was used.

 

CONTRAST: Patient injected with 100 ml mL of Isovue 370.

 

COMPARISON: 12/10/2013

 

TECHNIQUE: CT of the chest is performed on a spiral scan at 2 mm thick sections.  Study is performed 
with intravenous contrast timed for evaluation for pulmonary embolism.  This will limit additional po
rtions of the evaluation.  3-D MIP images reconstructed by the technologist are reviewed on the compu
ter in the coronal and sagittal planes. 

 

FINDINGS:

No persistent filling defects are evident to suggest an acute pulmonary embolism.

 

No mediastinal or hilar adenopathy enlarged by CT criteria is evident.  The ascending aorta diameter 
at the level of the main pulmonary artery is 3.2 cm.  The main pulmonary artery diameter at the bifur
cation is 2.3 cm.

 

Lung windows are clear.

 

Limited CT section through the upper abdomen are unremarkable.

 

IMPRESSIONS:

1. No acute pulmonary embolism.

## 2020-12-04 NOTE — P.PN
Subjective


Progress Note Date: 12/04/20


Principal diagnosis: 





Altered mental status





Suspect carbon monoxide poisoning





Severe COPD with possible exacerbation





History of multiple sclerosis





Generalized anxiety disorder





Activities smoker one had a half pack per day





12/04/2020, patient seen eval examined during the rounds shortness of breath 

continued to improve denies any cough or sputum production however patient c

ontinued to require 5 L nasal cannula, he may is afebrile, hemodynamically 

stable, able to decrease oxygen to 91% on 4 L oxygen due to high FiO2 to come 

and a computed tomography scan of the chest has been done, noted to be negative 

for pulmonary embolism, findings suggestive of COPD changes





12/03/2020, patient seen eval examined during the rounds labs reviewed 

medications reviewed care plan discussed, patient is undergoing physical therapy

and rehab, is on 5 L oxygen, no chest pain is present awake and alert breathing 

comfortably, noted that oxygen saturation were lower now compared to before as 

yesterday patient was on 5 L saturation is 96% today came down to 90% discussed 

with RN will try to taper down oxygen with frequently monitor oxygen saturation





12/02/2020, patient is awake and alert sitting upright in the bed breathing 

comfortably remains on 5 L oxygen, denies any chest pain denies any cough or 

sputum production, scans were daughter at length about sequence of events are 

slightly disorder breathing and COPD with the risk of exposure to carbon 

monoxide and to minimize the risk as well





This is a 65-year-old female with advanced multiple sclerosis, patient was 

brought into the hospital after she was found to be unresponsive on medical 

floor he was cyanotic, patient placed back on BiPAP with slow improvement in 

clinical condition have diffuse wheezing as well continue snaps treatment was 

given in the emergency department, subsequently admitted to the floor, patient 

was evaluated on medical floor she was currently 5 L oxygen she feels that she 

is back to baseline, denies any chest pain shortness of breath does have some 

cough, BiPAP is off, data as up been obtained from the patient that she was 

smoking in the garage according to her 5 cigarettes were smoked after that she 

went inside the room and passed out, EMS were notified and brought into hospital

for further evaluation, her past history significant for multiple sclerosis 

major depression anxiety disorder, likely severe COPD, hypertension hypertensive

cardiovascular disease, she smokes 1-1/2 pack per day every day








Objective





- Vital Signs


Vital signs: 


                                   Vital Signs











Temp  97.9 F   12/04/20 14:00


 


Pulse  60   12/04/20 14:00


 


Resp  18   12/04/20 14:00


 


BP  117/77   12/04/20 14:00


 


Pulse Ox  91 L  12/04/20 14:00








                                 Intake & Output











 12/03/20 12/04/20 12/04/20





 18:59 06:59 18:59


 


Intake Total 960  240


 


Output Total 200  


 


Balance 760  240


 


Weight  64.3 kg 


 


Intake:   


 


  Oral 960  240


 


Output:   


 


  Urine 200  


 


Other:   


 


  Voiding Method Bedside Commode Bedside Commode Bedside Commode





 Diaper Diaper Diaper


 


  # Voids 4 1 1


 


  # Bowel Movements 0  














- Exam





- Constitutional


General appearance: average body habitus, cooperative, disheveled, mild distress





- EENT


Eyes: EOMI, PERRLA


Ears: bilateral: normal





- Neck


Neck: normal ROM


Carotids: bilateral: upstroke normal


Thyroid: bilateral: normal size





- Respiratory


Respiratory: bilateral: diminished





- Cardiovascular


Rhythm: regular


Heart sounds: normal: S1, S2





- Gastrointestinal


General gastrointestinal: normal bowel sounds, soft





- Integumentary


Integumentary: decreased turgor, flushed





- Neurologic


Neurologic: CNII-XII intact





- Musculoskeletal


Musculoskeletal: gait normal, generalized weakness





- Psychiatric


Psychiatric: A&O x's 3, appropriate affect, intact judgment & insight








- Labs


CBC & Chem 7: 


                                 12/03/20 14:50





                                 12/03/20 09:36





Assessment and Plan


Assessment: 


Altered mental status





carbon monoxide poisoning





Severe COPD with possible exacerbation





History of multiple sclerosis





Generalized anxiety disorder





Activities smoker one had a half pack per day





Plan: 





Continue oxygen as we will attempt to wean it down slowly to room air, the scan 

of the chest reviewed





Continue bronchodilators monitor observe patient off of steroids antibiotics





smoking cessation counseling and advice also education about carbon monoxide p

oisoning also discussed with daughter at length





Agree with discharge planning each and may require some supplemental oxygen





Further workup and evaluation pending as per clinical response of the patient


Time with Patient: Greater than 30

## 2020-12-05 VITALS — HEART RATE: 80 BPM

## 2020-12-05 VITALS — TEMPERATURE: 98.4 F | SYSTOLIC BLOOD PRESSURE: 149 MMHG | RESPIRATION RATE: 19 BRPM | DIASTOLIC BLOOD PRESSURE: 80 MMHG

## 2020-12-05 RX ADMIN — FORMOTEROL FUMARATE DIHYDRATE SCH MCG: 20 SOLUTION RESPIRATORY (INHALATION) at 08:32

## 2020-12-05 RX ADMIN — IPRATROPIUM BROMIDE AND ALBUTEROL SULFATE SCH ML: .5; 3 SOLUTION RESPIRATORY (INHALATION) at 08:32

## 2020-12-05 RX ADMIN — METOPROLOL TARTRATE SCH MG: 25 TABLET, FILM COATED ORAL at 09:23

## 2020-12-05 RX ADMIN — DOCUSATE SODIUM SCH: 100 CAPSULE, LIQUID FILLED ORAL at 09:23

## 2020-12-05 RX ADMIN — ESCITALOPRAM OXALATE SCH MG: 20 TABLET, FILM COATED ORAL at 09:23

## 2020-12-05 RX ADMIN — NICOTINE SCH PATCH: 21 PATCH, EXTENDED RELEASE TRANSDERMAL at 09:23

## 2020-12-05 RX ADMIN — IPRATROPIUM BROMIDE AND ALBUTEROL SULFATE SCH ML: .5; 3 SOLUTION RESPIRATORY (INHALATION) at 00:43

## 2020-12-05 RX ADMIN — IPRATROPIUM BROMIDE AND ALBUTEROL SULFATE SCH ML: .5; 3 SOLUTION RESPIRATORY (INHALATION) at 11:57

## 2020-12-05 RX ADMIN — BUDESONIDE SCH MG: 1 SUSPENSION RESPIRATORY (INHALATION) at 08:32

## 2020-12-05 RX ADMIN — PRAMIPEXOLE DIHYDROCHLORIDE SCH MG: 0.5 TABLET ORAL at 09:23

## 2020-12-05 RX ADMIN — ENOXAPARIN SODIUM SCH MG: 40 INJECTION SUBCUTANEOUS at 09:23

## 2020-12-05 RX ADMIN — PANTOPRAZOLE SODIUM SCH MG: 40 TABLET, DELAYED RELEASE ORAL at 06:31

## 2020-12-05 RX ADMIN — IPRATROPIUM BROMIDE AND ALBUTEROL SULFATE SCH: .5; 3 SOLUTION RESPIRATORY (INHALATION) at 03:42

## 2020-12-05 NOTE — P.PN
Subjective


Progress Note Date: 12/05/20


Principal diagnosis: 





Altered mental status





Suspect carbon monoxide poisoning





Severe COPD with possible exacerbation





History of multiple sclerosis





Generalized anxiety disorder





Activities smoker one had a half pack per day





12/05/2020, patient seen eval examined during the rounds breathing has been 

stable patient is now on 3 L nasal cannula oxygen saturation is 92%, denies any 

chest pain respiratory status remained stable, mental status stable, patient can

be discharged home from pulmonary standpoint might need supplemental oxygen at 

the time of discharge with close monitoring and follow-up in outpatient





12/04/2020, patient seen eval examined during the rounds shortness of breath 

continued to improve denies any cough or sputum production however patient 

continued to require 5 L nasal cannula, he may is afebrile, hemodynamically 

stable, able to decrease oxygen to 91% on 4 L oxygen due to high FiO2 to come 

and a computed tomography scan of the chest has been done, noted to be negative 

for pulmonary embolism, findings suggestive of COPD changes





12/03/2020, patient seen eval examined during the rounds labs reviewed 

medications reviewed care plan discussed, patient is undergoing physical therapy

and rehab, is on 5 L oxygen, no chest pain is present awake and alert breathing 

comfortably, noted that oxygen saturation were lower now compared to before as 

yesterday patient was on 5 L saturation is 96% today came down to 90% discussed 

with RN will try to taper down oxygen with frequently monitor oxygen saturation





12/02/2020, patient is awake and alert sitting upright in the bed breathing 

comfortably remains on 5 L oxygen, denies any chest pain denies any cough or 

sputum production, scans were daughter at length about sequence of events are 

slightly disorder breathing and COPD with the risk of exposure to carbon 

monoxide and to minimize the risk as well





This is a 65-year-old female with advanced multiple sclerosis, patient was 

brought into the hospital after she was found to be unresponsive on medical 

floor he was cyanotic, patient placed back on BiPAP with slow improvement in 

clinical condition have diffuse wheezing as well continue snaps treatment was 

given in the emergency department, subsequently admitted to the floor, patient 

was evaluated on medical floor she was currently 5 L oxygen she feels that she 

is back to baseline, denies any chest pain shortness of breath does have some 

cough, BiPAP is off, data as up been obtained from the patient that she was 

smoking in the garage according to her 5 cigarettes were smoked after that she w

ent inside the room and passed out, EMS were notified and brought into hospital 

for further evaluation, her past history significant for multiple sclerosis 

major depression anxiety disorder, likely severe COPD, hypertension hypertensive

cardiovascular disease, she smokes 1-1/2 pack per day every day








Objective





- Vital Signs


Vital signs: 


                                   Vital Signs











Temp  98.4 F   12/05/20 08:00


 


Pulse  80   12/05/20 08:58


 


Resp  19   12/05/20 08:00


 


BP  149/80   12/05/20 08:00


 


Pulse Ox  88 L  12/05/20 08:00








                                 Intake & Output











 12/04/20 12/05/20 12/05/20





 18:59 06:59 18:59


 


Intake Total 360  


 


Balance 360  


 


Weight  63.5 kg 


 


Intake:   


 


  Oral 360  


 


Other:   


 


  Voiding Method Bedside Commode Bedside Commode 





 Diaper Diaper 


 


  # Voids 1 2 


 


  # Bowel Movements  2 














- Exam





- Constitutional


General appearance: average body habitus, cooperative, disheveled, mild distress





- EENT


Eyes: EOMI, PERRLA


Ears: bilateral: normal





- Neck


Neck: normal ROM


Carotids: bilateral: upstroke normal


Thyroid: bilateral: normal size





- Respiratory


Respiratory: bilateral: diminished





- Cardiovascular


Rhythm: regular


Heart sounds: normal: S1, S2





- Gastrointestinal


General gastrointestinal: normal bowel sounds, soft





- Integumentary


Integumentary: decreased turgor, flushed





- Neurologic


Neurologic: CNII-XII intact





- Musculoskeletal


Musculoskeletal: gait normal, generalized weakness





- Psychiatric


Psychiatric: A&O x's 3, appropriate affect, intact judgment & insight








- Labs


CBC & Chem 7: 


                                 12/03/20 14:50





                                 12/03/20 09:36





Assessment and Plan


Assessment: 


Altered mental status, improved significantly





carbon monoxide poisoning





Severe COPD with possible exacerbation stable with bronchodilators





History of multiple sclerosis





Generalized anxiety disorder





Activities smoker one had a half pack per day





Plan: 





Continue oxygen as we will attempt to wean it down slowly to room air, the scan 

of the chest reviewed





Continue bronchodilators monitor observe patient off of steroids antibiotics





smoking cessation counseling and advice also education about carbon monoxide 

poisoning also discussed with daughter at length





Agree with discharge planning each and may require some supplemental oxygen





Further workup and evaluation pending as per clinical response of the patient


Time with Patient: Greater than 30

## 2020-12-06 NOTE — P.DS
Providers


Date of admission: 


11/30/20 18:05





Expected date of discharge: 12/05/20


Attending physician: 


Ravinder Flower





Consults: 





                                        





12/01/20 10:32


Consult Physician Routine 


   Consulting Provider: Frantz Rios


   Consult Reason/Comments: copd


   Do you want consulting provider notified?: Yes











Primary care physician: 


[facility] Course: 





Chief Complaint: Short of breath





History of presenting complaint:


This is a pleasant 65-year-old patient of visiting physicians Dr. Monteiro.  

Chronic stable medical conditions include hypertension, multiple sclerosis, 

anxiety depression, hypertension, urinary incontinence.  Long-standing smoker.  

Daughter is at the bedside.  Last 2-3 days patient had getting increasingly 

short of breath.  Increasing wheezing.  And out of the house to smoke today came

back, and the last she remembers sitting on the sofa.  She was found on the 

carpeted floor.  She is very cyanotic and hypoxic and as per the EMS pulse ox 

was 60%.  Patient put on a BiPAP in the ER and patient recovered.  And after 

bronchodilators feeling better.  Still wheezing.  Has not followed with any 

doctor.  No seizure activity was reported.  No chest pain.


Admitted with-acute COPD exacerbation, acute hypoxic and hypercapnic respiratory

failure, acute metabolic encephalopathy from above.  Started on bronchodilators,

steroids.  PE was ruled out.


Today-doing better.  Oral intake.  Fair.  Reminded about smoking cessation..  

Will be going home on home oxygen.


Discussion and discharge planning more than 35 minutes





Consultation:


Dr. AYAD Rios from pulmonary








Physical examination:


VITAL SIGNS: 98.4, 84, 19, 149/80, 92% on 3 L


GENERAL: Sitting up in chair, breathing better


EYES: Pupils equal.  Conjunctiva normal.


NECK: JVD not raised; masses not palpable.


HEART: First and second heart sounds are normal;  no edema.  


LUNGS: Respiratory rate increased, diminished breath sound prolonged expiration 

-some wheezing.  


ABDOMEN: Soft,  nontender, liver spleen not palpable, no masses palpable.  


PSYCH: [Alert and oriented x3;  mood  and affect anxious .  








INVESTIGATIONS, reviewed in the clinical context:


Chest CT-negative for PE


Creatinine 0.86


Previous testing:


White count 11.8 hemoglobin 15.5 platelets 249 potassium 4.6 bun 18 creatinine 

1.67


Lactic acid 4.7


Troponin I less than 0.012


Coronavirus P/Cr-not detected


EKG tracing personally reviewed by me-normal sinus rhythm, P pulmonale


Chest x-ray film personally reviewed by me-no obvious infiltrate.  Prominent 

pulmonary artery





Assessment:


-Acute severe COPD exacerbation in a current POA


-Acute hypoxic, suspect hypercapnic respiratory failure, POA on 4 L nasal 

cannula.


-Acute metabolic encephalopathy from CO2 narcosis likely on presentation-

resolved


-Chronic nicotine dependence patient active cigarette smoker


-Essential hypertension


-Chronic urinary incontinence


-Anxiety depression not otherwise specified


-Restless leg syndrome started on Mirapex


-Acute kidney injury likely ATN and prerenal-POA-resolved





Disposition:


Home





Patient Condition at Discharge: Stable





Plan - Discharge Summary


Discharge Rx Participant: No


New Discharge Prescriptions: 


New


   Ipratropium-Albuterol Nebulize [Duoneb 0.5 mg-3 mg/3 ml Soln] 3 ml INHALATION

QID #120 ml


   Nicotine 21Mg/24Hr Patch [Habitrol] 1 patch TRANSDERM DAILY #14 patch


   Pramipexole [Mirapex] 0.5 mg PO BID #60 tab


   predniSONE 10 mg PO DAILY #30 tab





Continue


   Escitalopram [Lexapro] 20 mg PO DAILY


   Ondansetron Odt [Zofran ODT] 4 mg PO Q12HR PRN


     PRN Reason: Nausea


   Acetaminophen Tab [Tylenol] 1,000 mg PO DAILY PRN


     PRN Reason: Pain


   ALPRAZolam [Xanax] 0.5 mg PO HS PRN


     PRN Reason: Insomnia


   ALPRAZolam [Xanax] 0.5 mg PO TID


   Budesonide/Formoterol Fumarate [Symbicort 160-4.5 Mcg Inhaler] 1 puff 

INHALATION RT-BID


   Omeprazole 20 mg PO DAILY


   OLANZapine [ZyPREXA] 10 mg PO DAILY


   Teriflunomide [Aubagio] 7 mg PO DAILY





Discontinued


   Meclizine [Antivert] 25 mg PO TID PRN


     PRN Reason: DIZZINESS





No Action


   amLODIPine BESYLATE/BENAZEPRIL [Lotrel 10-40 MG] 1 cap PO HS


   Ibuprofen [Motrin] 800 mg PO Q6H PRN


     PRN Reason: Pain


   Metoprolol Tartrate [Lopressor] 25 mg PO DAILY


   Furosemide [Lasix] 20 mg PO DAILY


Discharge Medication List





Escitalopram [Lexapro] 20 mg PO DAILY 01/29/16 [History]


ALPRAZolam [Xanax] 0.5 mg PO HS PRN 06/02/19 [History]


ALPRAZolam [Xanax] 0.5 mg PO TID 06/02/19 [History]


Acetaminophen Tab [Tylenol] 1,000 mg PO DAILY PRN 06/02/19 [History]


Ibuprofen [Motrin] 800 mg PO Q6H PRN 06/02/19 [History]


Ondansetron Odt [Zofran ODT] 4 mg PO Q12HR PRN 06/02/19 [History]


amLODIPine BESYLATE/BENAZEPRIL [Lotrel 10-40 MG] 1 cap PO HS 06/02/19 [History]


Budesonide/Formoterol Fumarate [Symbicort 160-4.5 Mcg Inhaler] 1 puff INHALATION

RT-BID 11/30/20 [History]


Furosemide [Lasix] 20 mg PO DAILY 11/30/20 [History]


Metoprolol Tartrate [Lopressor] 25 mg PO DAILY 11/30/20 [History]


OLANZapine [ZyPREXA] 10 mg PO DAILY 11/30/20 [History]


Omeprazole 20 mg PO DAILY 11/30/20 [History]


Teriflunomide [Aubagio] 7 mg PO DAILY 11/30/20 [History]


Ipratropium-Albuterol Nebulize [Duoneb 0.5 mg-3 mg/3 ml Soln] 3 ml INHALATION 

QID #120 ml 12/04/20 [Rx]


Nicotine 21Mg/24Hr Patch [Habitrol] 1 patch TRANSDERM DAILY #14 patch 12/04/20 

[Rx]


Pramipexole [Mirapex] 0.5 mg PO BID #60 tab 12/04/20 [Rx]


predniSONE 10 mg PO DAILY #30 tab 12/04/20 [Rx]








Follow up Appointment(s)/Referral(s): 


[facility] [NON-STAFF] - 1-2 Days


(Please call the office on Monday to schedule a follow up appointment


)


Jay Jay Monteiro MD [Primary Care Provider] - 1-2 days


(please call to make follow up appointment on Monday


)


Frantz Rios MD [STAFF PHYSICIAN] - 1 Week (Please call the office on Monday to 

schedule follow up appointment)


Patient Instructions/Handouts:  How to Stop Smoking (DC), COPD (Chronic 

Obstructive Pulmonary Disease) (DC)


Activity/Diet/Wound Care/Special Instructions: 


Patient will need home oxygen and a nebulizer at discharge secondary to COPD - 

ordered through [facility] - [phone]





Discharge/Stand Alone Forms:  [facility]


Discharge Disposition: HOME SELF-CARE

## 2023-03-27 ENCOUNTER — HOSPITAL ENCOUNTER (OUTPATIENT)
Dept: HOSPITAL 47 - RADMAMWWP | Age: 68
Discharge: HOME | End: 2023-03-27
Attending: FAMILY MEDICINE
Payer: MEDICARE

## 2023-03-27 DIAGNOSIS — Z12.31: Primary | ICD-10-CM

## 2023-03-27 DIAGNOSIS — Z80.3: ICD-10-CM

## 2023-03-27 DIAGNOSIS — Z78.0: ICD-10-CM

## 2023-03-27 PROCEDURE — 77063 BREAST TOMOSYNTHESIS BI: CPT

## 2023-03-27 PROCEDURE — 77067 SCR MAMMO BI INCL CAD: CPT

## 2023-03-27 NOTE — MM
Reason for Exam: Screening  (asymptomatic). 

Last mammogram was performed 5 year(s) and 1 month(s) ago. 





Patient History: 

Menarche at age 10. First Full-Term Pregnancy at age 24. Postmenopausal.

Maternal grandmother had breast cancer, age 80. 





Risk Values: 

Edna 5 year model risk: 1.6%.

NCI Lifetime model risk: 6.2%.





Prior Study Comparison: 

4/2/2012 Bilateral Screening Mammogram, [facility]. 8/23/2016 Bilateral Screening Mammogram, [facility]. 2/23/2018

Bilateral Screening Mammogram, [facility]. 





Tissue Density: 

The breast tissue is heterogeneously dense. This may lower the sensitivity of mammography.





Findings: 

Analyzed By CAD. 

There is occasional benign-appearing round calcification redemonstrated throughout the bilateral

breasts.



There is no suspicious new group of microcalcifications or new suspicious mass in either breast. 





Overall Assessment: Benign, BI-RAD 2





Management: 

Screening Mammogram of both breasts in 1 year.

A clinical breast exam by your physician is recommended on an annual basis and results should be

correlated with mammographic findings.



Electronically signed and approved by: Raymond Baldwin M.D.